# Patient Record
Sex: FEMALE | Race: WHITE | NOT HISPANIC OR LATINO | Employment: OTHER | URBAN - METROPOLITAN AREA
[De-identification: names, ages, dates, MRNs, and addresses within clinical notes are randomized per-mention and may not be internally consistent; named-entity substitution may affect disease eponyms.]

---

## 2018-08-22 ENCOUNTER — FOLLOW-UP (OUTPATIENT)
Dept: URBAN - METROPOLITAN AREA CLINIC 19 | Facility: CLINIC | Age: 63
Setting detail: DERMATOLOGY
End: 2018-08-22

## 2018-08-22 DIAGNOSIS — Z86.03 PERSONAL HISTORY OF NEOPLASM OF UNCERTAIN BEHAVIOR: ICD-10-CM

## 2018-08-22 DIAGNOSIS — L82.1 OTHER SEBORRHEIC KERATOSIS: ICD-10-CM

## 2018-08-22 DIAGNOSIS — L74.519 PRIMARY FOCAL HYPERHIDROSIS, UNSPECIFIED: ICD-10-CM

## 2018-08-22 DIAGNOSIS — L74.510 PRIMARY FOCAL HYPERHIDROSIS, AXILLA: ICD-10-CM

## 2018-08-22 DIAGNOSIS — L57.8 OTHER SKIN CHANGES DUE TO CHRONIC EXPOSURE TO NONIONIZING RADIATION: ICD-10-CM

## 2018-08-22 PROBLEM — D18.01 HEMANGIOMA OF SKIN AND SUBCUTANEOUS TISSUE: Status: RESOLVED | Noted: 2018-08-22

## 2018-08-22 PROBLEM — L82.0 INFLAMED SEBORRHEIC KERATOSIS: Status: RESOLVED | Noted: 2018-08-22

## 2018-08-22 PROBLEM — L90.5 SCAR CONDITIONS AND FIBROSIS OF SKIN: Status: RESOLVED | Noted: 2018-08-22

## 2018-08-22 PROCEDURE — 17110 DESTRUCT B9 LESION 1-14: CPT

## 2018-08-22 PROCEDURE — 99213 OFFICE O/P EST LOW 20 MIN: CPT

## 2018-11-28 ENCOUNTER — FOLLOW-UP (OUTPATIENT)
Dept: URBAN - METROPOLITAN AREA CLINIC 19 | Facility: CLINIC | Age: 63
Setting detail: DERMATOLOGY
End: 2018-11-28

## 2018-11-28 ENCOUNTER — RX ONLY (RX ONLY)
Age: 63
End: 2018-11-28

## 2018-11-28 DIAGNOSIS — D49.2 NEOPLASM OF UNSPECIFIED BEHAVIOR OF BONE, SOFT TISSUE, AND SKIN: ICD-10-CM

## 2018-11-28 PROCEDURE — 99212 OFFICE O/P EST SF 10 MIN: CPT

## 2018-11-28 RX ORDER — IVERMECTIN 10 MG/G
1 APPLICATION CREAM TOPICAL DAILY
Qty: 45 | Refills: 3
Start: 2018-11-28

## 2019-08-28 ENCOUNTER — COMPLETE SKIN EXAM (OUTPATIENT)
Dept: URBAN - METROPOLITAN AREA CLINIC 19 | Facility: CLINIC | Age: 64
Setting detail: DERMATOLOGY
End: 2019-08-28

## 2019-08-28 DIAGNOSIS — C44.729 SQUAMOUS CELL CARCINOMA OF SKIN OF LEFT LOWER LIMB, INCLUDING HIP: ICD-10-CM

## 2019-08-28 PROBLEM — L82.1 OTHER SEBORRHEIC KERATOSIS: Status: RESOLVED | Noted: 2019-08-28

## 2019-08-28 PROBLEM — D18.01 HEMANGIOMA OF SKIN AND SUBCUTANEOUS TISSUE: Status: RESOLVED | Noted: 2019-08-28

## 2019-08-28 PROCEDURE — 99214 OFFICE O/P EST MOD 30 MIN: CPT

## 2019-10-16 ENCOUNTER — RX ONLY (RX ONLY)
Age: 64
End: 2019-10-16

## 2019-10-16 ENCOUNTER — OTHER (OUTPATIENT)
Dept: URBAN - METROPOLITAN AREA CLINIC 19 | Facility: CLINIC | Age: 64
Setting detail: DERMATOLOGY
End: 2019-10-16

## 2019-10-16 DIAGNOSIS — Z48.02 ENCOUNTER FOR REMOVAL OF SUTURES: ICD-10-CM

## 2019-10-16 PROBLEM — B35.4 TINEA CORPORIS: Status: RESOLVED | Noted: 2019-10-16

## 2019-10-16 PROCEDURE — 99212 OFFICE O/P EST SF 10 MIN: CPT

## 2019-10-16 RX ORDER — CICLOPIROX 7.7 MG/G
AS DIRECTED GEL TOPICAL TWICE A DAY
Qty: 45 | Refills: 0
Start: 2019-10-16

## 2019-10-16 RX ORDER — DESONIDE 0.5 MG/G
A SMALL AMOUNT CREAM TOPICAL TWICE A DAY
Qty: 60 | Refills: 1
Start: 2019-10-16

## 2020-08-26 ENCOUNTER — COMPLETE SKIN EXAM (OUTPATIENT)
Dept: URBAN - METROPOLITAN AREA CLINIC 19 | Facility: CLINIC | Age: 65
Setting detail: DERMATOLOGY
End: 2020-08-26

## 2020-08-26 DIAGNOSIS — L82.0 INFLAMED SEBORRHEIC KERATOSIS: ICD-10-CM

## 2020-08-26 PROBLEM — Z85.828 PERSONAL HISTORY OF OTHER MALIGNANT NEOPLASM OF SKIN: Status: RESOLVED | Noted: 2020-08-26

## 2020-08-26 PROBLEM — L57.0 ACTINIC KERATOSIS: Status: RESOLVED | Noted: 2020-08-26

## 2020-08-26 PROCEDURE — 17003 DESTRUCT PREMALG LES 2-14: CPT

## 2020-08-26 PROCEDURE — 99214 OFFICE O/P EST MOD 30 MIN: CPT

## 2020-08-26 PROCEDURE — 17000 DESTRUCT PREMALG LESION: CPT

## 2021-02-24 ENCOUNTER — SPOT (OUTPATIENT)
Dept: URBAN - METROPOLITAN AREA CLINIC 19 | Facility: CLINIC | Age: 66
Setting detail: DERMATOLOGY
End: 2021-02-24

## 2021-02-24 DIAGNOSIS — L70.0 ACNE VULGARIS: ICD-10-CM

## 2021-02-24 PROCEDURE — 11103 TANGNTL BX SKIN EA SEP/ADDL: CPT

## 2021-02-24 PROCEDURE — 11102 TANGNTL BX SKIN SINGLE LES: CPT

## 2021-03-31 ENCOUNTER — CRYOTHERAPY (OUTPATIENT)
Dept: URBAN - METROPOLITAN AREA CLINIC 19 | Facility: CLINIC | Age: 66
Setting detail: DERMATOLOGY
End: 2021-03-31

## 2021-03-31 PROBLEM — L57.0 ACTINIC KERATOSIS: Status: RESOLVED | Noted: 2021-03-31

## 2021-03-31 PROCEDURE — 17000 DESTRUCT PREMALG LESION: CPT

## 2021-09-20 ENCOUNTER — OFFICE (OUTPATIENT)
Dept: URBAN - METROPOLITAN AREA CLINIC 72 | Facility: CLINIC | Age: 66
End: 2021-09-20
Payer: COMMERCIAL

## 2021-09-20 VITALS
DIASTOLIC BLOOD PRESSURE: 73 MMHG | HEART RATE: 80 BPM | SYSTOLIC BLOOD PRESSURE: 162 MMHG | WEIGHT: 121 LBS | HEIGHT: 59 IN

## 2021-09-20 DIAGNOSIS — Z90.49 ACQUIRED ABSENCE OF OTHER SPECIFIED PARTS OF DIGESTIVE TRACT: ICD-10-CM

## 2021-09-20 DIAGNOSIS — M81.0 AGE-RELATED OSTEOPOROSIS WITHOUT CURRENT PATHOLOGICAL FRACTU: ICD-10-CM

## 2021-09-20 DIAGNOSIS — K50.818 CROHN'S DISEASE OF BOTH SMALL AND LARGE INTESTINE WITH OTHER: ICD-10-CM

## 2021-09-20 PROCEDURE — 99204 OFFICE O/P NEW MOD 45 MIN: CPT | Performed by: INTERNAL MEDICINE

## 2021-09-20 RX ORDER — SODIUM SULFATE, MAGNESIUM SULFATE, AND POTASSIUM CHLORIDE 17.75; 2.7; 2.25 G/1; G/1; G/1
TABLET ORAL
Qty: 1 | Refills: 0 | Status: COMPLETED
Start: 2021-09-20 | End: 2021-11-17

## 2021-09-20 NOTE — SERVICENOTES
Our goal is to partner with you to improve your health and well being. It is important for you to complete necessary testing and follow the instructions given to you at your clinic visit. Our office will call you within 2 weeks with results of any testing but you may also call sooner to obtain results - [phone].   If you have any questions or concerns please feel free to call us.  We take your care very seriously and we thank you for your trust!
- continue current dose of pentasa for now
- labs, stool and colonoscopy
- follow up 7-10 days after procedure

## 2021-09-20 NOTE — SERVICEHPINOTES
66 year old female with crohn's disease
amada ybarra 
She quit smoking in 1977.  She has had crohn's since her 20's.  amada ybarra She was seen by Dr. Gutierrez in 1/2017 per that visit
amada "She reports that she was diagnosed with Crohn's ileocolitis at age 19 and in reviewing some of the records that she brings with her today, she has had multiple surgeries with ~68cm of resected small bowel. She reports that her last surgery was a little over 10yrs ago.Lanette most recent colonsocopy was done in 6/2015 (while living in [place]) and was remarkable for an ileo-colonic anastamosis in the ascending colon which was patent - however, the anastamosis and her caleb-TI contained a few patchy non-bleeding erosions.brPer her report, she has only been on 5-ASA medications - previously Pentasa but currently Sulfasalazine. On this regimen, she has been having 1-3 formed BMs/day without any GI tract bleeding symptoms or abdominal pain/cramping. She reports that her weight has been stable and her appetite has been good. She also reports that she has been diagnosed with osteoporosis and has been on Prolia but could not tolerate it - she is going to have a follow-up DEXA scan through 's office in April."
amada ybarra She did not follow up on Dr. Gutierrez's recommendation for work up and treatmnet.  
amada Mayers has been seeing Dr. Meneses in [facility].  Her last surgery was 1994.  She flared around 2012.  She has tried to stay off biologics.  She had another flare in June and was on prednisone since June to Aug.  When she flares she gets bad abdominal pain and obstructive symptoms.  Her last colonoscopy was at least 4-5 years ago.  
amada ybarra She is currently on pentasa 6 pills a day.  She is also on medicine for GERD (prevacid and pepcid) and has had a fundoplication
amada ybarra Currently she has a BM 2-3 BM in the AM soft to water.  No blood.  Only mild pain after eating.  My nurse has reviewed and updated the medication list with the patient (medication reconciliation). I have also reviewed the medication list. New updates were made to the patient's medical, social and family history. Pertinent details are also noted above in the HPI

## 2021-09-22 ENCOUNTER — COMPLETE SKIN EXAM (OUTPATIENT)
Dept: URBAN - METROPOLITAN AREA CLINIC 19 | Facility: CLINIC | Age: 66
Setting detail: DERMATOLOGY
End: 2021-09-22

## 2021-09-22 DIAGNOSIS — L40.9 PSORIASIS, UNSPECIFIED: ICD-10-CM

## 2021-09-22 PROBLEM — L82.1 OTHER SEBORRHEIC KERATOSIS: Status: RESOLVED | Noted: 2021-09-22

## 2021-09-22 PROBLEM — D18.01 HEMANGIOMA OF SKIN AND SUBCUTANEOUS TISSUE: Status: RESOLVED | Noted: 2021-09-22

## 2021-09-22 PROCEDURE — 99213 OFFICE O/P EST LOW 20 MIN: CPT

## 2021-11-10 ENCOUNTER — OFFICE (OUTPATIENT)
Dept: URBAN - METROPOLITAN AREA PATHOLOGY 24 | Facility: PATHOLOGY | Age: 66
End: 2021-11-10
Payer: COMMERCIAL

## 2021-11-10 ENCOUNTER — AMBULATORY SURGICAL CENTER (OUTPATIENT)
Dept: URBAN - METROPOLITAN AREA SURGERY 19 | Facility: SURGERY | Age: 66
End: 2021-11-10
Payer: COMMERCIAL

## 2021-11-10 DIAGNOSIS — K62.4 STENOSIS OF ANUS AND RECTUM: ICD-10-CM

## 2021-11-10 DIAGNOSIS — K50.90 CROHN'S DISEASE, UNSPECIFIED, WITHOUT COMPLICATIONS: ICD-10-CM

## 2021-11-10 DIAGNOSIS — K51.40 INFLAMMATORY POLYPS OF COLON WITHOUT COMPLICATIONS: ICD-10-CM

## 2021-11-10 PROCEDURE — 45380 COLONOSCOPY AND BIOPSY: CPT | Performed by: INTERNAL MEDICINE

## 2021-11-10 PROCEDURE — 88342 IMHCHEM/IMCYTCHM 1ST ANTB: CPT | Performed by: INTERNAL MEDICINE

## 2021-11-10 PROCEDURE — 88305 TISSUE EXAM BY PATHOLOGIST: CPT | Performed by: INTERNAL MEDICINE

## 2021-11-18 ENCOUNTER — OFFICE (OUTPATIENT)
Dept: URBAN - METROPOLITAN AREA CLINIC 72 | Facility: CLINIC | Age: 66
End: 2021-11-18
Payer: COMMERCIAL

## 2021-11-18 VITALS
DIASTOLIC BLOOD PRESSURE: 62 MMHG | SYSTOLIC BLOOD PRESSURE: 108 MMHG | WEIGHT: 121 LBS | HEART RATE: 68 BPM | HEIGHT: 59 IN | OXYGEN SATURATION: 99 %

## 2021-11-18 DIAGNOSIS — K50.818 CROHN'S DISEASE OF BOTH SMALL AND LARGE INTESTINE WITH OTHER: ICD-10-CM

## 2021-11-18 PROCEDURE — 99214 OFFICE O/P EST MOD 30 MIN: CPT | Performed by: INTERNAL MEDICINE

## 2021-11-18 RX ORDER — VEDOLIZUMAB 300 MG/5ML
INJECTION, POWDER, LYOPHILIZED, FOR SOLUTION INTRAVENOUS
Qty: 1 | Refills: 8 | Status: COMPLETED
Start: 2021-11-18 | End: 2022-11-28

## 2021-11-18 RX ORDER — BUDESONIDE 3 MG/1
CAPSULE ORAL
Qty: 84 | Refills: 0 | Status: COMPLETED
Start: 2021-11-18 | End: 2022-05-23

## 2021-11-18 NOTE — SERVICENOTES
Our goal is to partner with you to improve your health and well being. It is important for you to complete necessary testing and follow the instructions given to you at your clinic visit. Our office will call you within 2 weeks with results of any testing but you may also call sooner to obtain results [phone].   If you have any questions or concerns please feel free to call.  We take your care very seriously and we thank you for your trust!
- if you don't hear from us by the end of november please call
- we will start getting entyvio arranged
- labs today
- budesonide 3 pills a day for 2 weeks, then 2 pills a day for 2 weeks, then 1 pill a day for 2 weeks.  
- Once you are on the entyvio and doing well we will repeat fecal calprotectin and decide about weaning off pentasa
- follow up in 4 months, sooner if needed

## 2021-11-18 NOTE — SERVICEHPINOTES
Denisa Daily   is seen today for a follow-up visit.  
br
br66 year old female with crohn's diseaseShe quit smoking in 1977. She has had crohn's since her 20's. She was seen by Dr. Gutierrez in 1/2017 per that visitbr"She reports that she was diagnosed with Crohn's ileocolitis at age 19 and in reviewing some of the records that she brings with her today, she has had multiple surgeries with ~68cm of resected small bowel. She reports that her last surgery was a little over 10yrs ago.Lanette most recent colonsocopy was done in 6/2015 (while living in [place]) and was remarkable for an ileo-colonic anastamosis in the ascending colon which was patent - however, the anastamosis and her caleb-TI contained a few patchy non-bleeding erosions.brPer her report, she has only been on 5-ASA medications - previously Pentasa but currently Sulfasalazine. On this regimen, she has been having 1-3 formed BMs/day without any GI tract bleeding symptoms or abdominal pain/cramping. She reports that her weight has been stable and her appetite has been good. She also reports that she has been diagnosed with osteoporosis and has been on Prolia but could not tolerate it - she is going to have a follow-up DEXA scan through 's office in April."She did not follow up on Dr. Gutierrez's recommendation for work up and treatmnet. She has been seeing Dr. eMneses in [facility]. Her last surgery was 1994. She flared around 2012. She has tried to stay off biologics. She had another flare in June and was on prednisone since June to Aug. When she flares she gets bad abdominal pain and obstructive symptoms. Her last colonoscopy was at least 4-5 years ago. She is currently on pentasa 6 pills a day. She is also on medicine for GERD (prevacid and pepcid) and has had a fundoplicationCurrently she has a BM 2-3 BM in the AM soft to water. No blood. Only mild pain after eating.    br  Plan from last visit:
amada
br- continue current dose of pentasa for nowbr- labs, stool and colonoscopybr- follow up 7-10 days after procedure Interval History:  11/18/2021   
amada ybarra   She has been flaring, having some RLQ pain and diarrhea.  Sometimes she gets it into the back and into the perineum.  She is watching her diet.  
br
She hasn't seen any blood in th estool.  br She is taking pentasa 6 pills a day. when she tried to cut down dose she felt like she got worse. 
br
br br
br ?My nurse has reviewed and updated the medication list with the patient (medication reconciliation). I have also reviewed the medication list. New updates were made to the patient's medical, social and family history. Pertinent details are also noted above in the HPI.br visited="true"

## 2022-03-03 ENCOUNTER — OFFICE (OUTPATIENT)
Dept: URBAN - METROPOLITAN AREA CLINIC 72 | Facility: CLINIC | Age: 67
End: 2022-03-03
Payer: COMMERCIAL

## 2022-03-03 VITALS
SYSTOLIC BLOOD PRESSURE: 132 MMHG | WEIGHT: 124 LBS | RESPIRATION RATE: 14 BRPM | HEIGHT: 59 IN | DIASTOLIC BLOOD PRESSURE: 76 MMHG | TEMPERATURE: 97.9 F | HEART RATE: 64 BPM

## 2022-03-03 DIAGNOSIS — Z90.49 ACQUIRED ABSENCE OF OTHER SPECIFIED PARTS OF DIGESTIVE TRACT: ICD-10-CM

## 2022-03-03 DIAGNOSIS — K50.818 CROHN'S DISEASE OF BOTH SMALL AND LARGE INTESTINE WITH OTHER: ICD-10-CM

## 2022-03-03 PROCEDURE — 99214 OFFICE O/P EST MOD 30 MIN: CPT | Performed by: INTERNAL MEDICINE

## 2022-03-03 NOTE — SERVICENOTES
Our goal is to partner with you to improve your health and well being. It is important for you to complete necessary testing and follow the instructions given to you at your clinic visit. Our office will call you within 2 weeks with results of any testing but you may also call sooner to obtain results [phone].   If you have any questions or concerns please feel free to call.  We take your care very seriously and we thank you for your trust!
- continue entyvio
- labs and stool studies
- we will adjust supplements based on your labs
- get stool test, depending on your stool inflammation we will do xifaxin, wean pentasa or do both.  
- follow up in 3 months

## 2022-03-03 NOTE — SERVICEHPINOTES
Denisa Daily   is seen today for a follow-up visit.  
br
br66 year old female with crohn's disease, she also has osteoporosisShe quit smoking in 1977. She has had crohn's since her 20's.She was seen by Dr. Gutierrez in 1/2017 per that visitbr"She reports that she was diagnosed with Crohn's ileocolitis at age 19 and in reviewing some of the records that she brings with her today, she has had multiple surgeries with ~68cm of resected small bowel. She reports that her last surgery was a little over 10yrs ago.Lanette most recent colonsocopy was done in 6/2015 (while living in [place]) and was remarkable for an ileo-colonic anastamosis in the ascending colon which was patent - however, the anastamosis and her caleb-TI contained a few patchy non-bleeding erosions.brPer her report, she has only been on 5-ASA medications - previously Pentasa but currently Sulfasalazine. On this regimen, she has been having 1-3 formed BMs/day without any GI tract bleeding symptoms or abdominal pain/cramping. She reports that her weight has been stable and her appetite has been good. She also reports that she has been diagnosed with osteoporosis and has been on Prolia but could not tolerate it - she is going to have a follow-up DEXA scan through 's office in April."She did not follow up on Dr. Gutierrez's recommendation for work up and treatmnet.She has been seeing Dr. Meneses in [facility]. Her last surgery was 1994. She flared around 2012. She has tried to stay off biologics. She had another flare in June and was on prednisone since June to Aug. When she flares she gets bad abdominal pain and obstructive symptoms. Her last colonoscopy was at least 4-5 years ago.She is currently on pentasa 6 pills a day. She is also on medicine for GERD (prevacid and pepcid) and has had a fundoplicationCurrently she has a BM 2-3 BM in the AM soft to water. No blood. Only mild pain after eating.brPlan from last visit:- continue current dose of pentasa for nowbr- labs, stool and colonoscopybr- follow up 7-10 days after procedureInterval History:11/18/2021She has been flaring, having some RLQ pain and diarrhea. Sometimes she gets it into the back and into the perineum. She is watching her diet. Riana hasn't seen any blood in th estool. Riana is taking pentasa 6 pills a day. when she tried to cut down dose she felt like she got worse.    br  Plan from last visit:
br
br- if you don't hear from us by the end of november please callbr- we will start getting entyvio arrangedbr- labs todaybr- budesonide 3 pills a day for 2 weeks, then 2 pills a day for 2 weeks, then 1 pill a day for 2 weeks. br- Once you are on the entyvio and doing well we will repeat fecal calprotectin and decide about weaning off pentasabr- follow up in 4 months, sooner if needed Interval History:  3/3/2022   
br   She received initial dose of entyvio 12/23
br She has had 3 doses.  she is now on the 8 week maintenance. 
br She is starting to feel a little better. 
Riana is getting it done at [address] in  which is working well for us she doesn't pay anything. 
br 
She is off budesonide.  
br She is still taking pentasa 6 pills ad ay. 
br She is still having some right pelvic pain that she feels like is improving. 
br She is still having loose BM, diarrhea is not as often.  It was intiailly 4-5 times a day and now twice a day br
br My nurse has reviewed and updated the medication list with the patient (medication reconciliation). I have also reviewed the medication list. New updates were made to the patient's medical, social and family history. Pertinent details are also noted above in the HPI.br visited="true"

## 2022-05-23 ENCOUNTER — OFFICE (OUTPATIENT)
Dept: URBAN - METROPOLITAN AREA CLINIC 72 | Facility: CLINIC | Age: 67
End: 2022-05-23
Payer: COMMERCIAL

## 2022-05-23 VITALS
SYSTOLIC BLOOD PRESSURE: 122 MMHG | HEIGHT: 59 IN | OXYGEN SATURATION: 99 % | DIASTOLIC BLOOD PRESSURE: 82 MMHG | HEART RATE: 70 BPM | WEIGHT: 121 LBS

## 2022-05-23 DIAGNOSIS — K50.818 CROHN'S DISEASE OF BOTH SMALL AND LARGE INTESTINE WITH OTHER: ICD-10-CM

## 2022-05-23 DIAGNOSIS — Z90.49 ACQUIRED ABSENCE OF OTHER SPECIFIED PARTS OF DIGESTIVE TRACT: ICD-10-CM

## 2022-05-23 DIAGNOSIS — D51.3 OTHER DIETARY VITAMIN B12 DEFICIENCY ANEMIA: ICD-10-CM

## 2022-05-23 DIAGNOSIS — E55.9 VITAMIN D DEFICIENCY, UNSPECIFIED: ICD-10-CM

## 2022-05-23 PROCEDURE — 99214 OFFICE O/P EST MOD 30 MIN: CPT | Performed by: INTERNAL MEDICINE

## 2022-05-23 RX ORDER — SODIUM SULFATE, MAGNESIUM SULFATE, AND POTASSIUM CHLORIDE 17.75; 2.7; 2.25 G/1; G/1; G/1
TABLET ORAL
Qty: 1 | Refills: 0 | Status: ACTIVE
Start: 2022-05-23

## 2022-05-23 NOTE — SERVICEHPINOTES
Denisa Daily   is seen today for a follow-up visit.  
br
br66 year old female with crohn's disease, she also has [id number] 
br She quit smoking in 1977. She has had crohn's since her 20's.She was seen by Dr. Gutierrez in 1/2017 per that visitbr"She reports that she was diagnosed with Crohn's ileocolitis at age 19 and in reviewing some of the records that she brings with her today, she has had multiple surgeries with ~68cm of resected small bowel. She reports that her last surgery was a little over 10yrs ago.Lanette most recent colonoscope was done in 6/2015 (while living in [place]) and was remarkable for an ileo-colonic anastomosis in the ascending colon which was patent - however, the anastomosis and her caleb-TI contained a few patchy non-bleeding erosions.brPer her report, she has only been on 5-ASA medications - previously Pentasa but currently Sulfasalazine. On this regimen, she has been having 1-3 formed BMs/day without any GI tract bleeding symptoms or abdominal pain/cramping. She reports that her weight has been stable and her appetite has been good. She also reports that she has been diagnosed with osteoporosis and has been on Prolia but could not tolerate it - she is going to have a follow-up DEXA scan through 's office in April."She did not follow up on Dr. Gutierrez's recommendation for work up and treatment.She has been seeing Dr. Meneses in [facility]. Her last surgery was 1994. She flared around 2012. She has tried to stay off biologics. She had another flare in June and was on prednisone since June to Aug. When she flares she gets bad abdominal pain and obstructive symptoms. Her last colonoscopy was at least 4-5 years ago.She is currently on pentasa 6 pills a day. She is also on medicine for GERD (prevacid and pepcid) and has had a fundoplicationCurrently she has a BM 2-3 BM in the AM soft to water. No blood. Only mild pain after eating.Interval History:11/18/2021She has been flaring, having some RLQ pain and diarrhea. Sometimes she gets it into the back and into the perineum. She is watching her diet.brShe hasn't seen any blood in th estool.brSdarion is taking pentasa 6 pills a day. when she tried to cut down dose she felt like she got worse.brInterval History:3/3/2022bAndrae received initial dose of entyvio 12/23brShe has had 3 doses. she is now on the 8 week maintenance. Riana is starting to feel a little better. Riana is getting it done at [address] in  which is working well for us she doesn't pay anything. Riana is off budesonide. Riana is still taking pentasa 6 pills ad ay. Riana is still having some right pelvic pain that she feels like is improving. Riana is still having loose BM, diarrhea is not as often. It was intiailly 4-5 times a day and now twice a day br    br  Plan from last visit:
br
br continue entyviobr- labs and stool studiesbr- we will adjust supplements based on your labsbr- get stool test, depending on your stool inflammation we will do xifaxin, wean pentasa or do both. br- follow up in 3 months Interval History:  5/23/2022   
br   vit D and b12 were normal 
br FC was 130
br I asked her to remain on pentasa and start xifaxin
brHer last infusion was about 8 weeks ago.  She felt like that helped her back and perineal pain is better. 
br SHe has had 4 URI infections on entyvio but she has also had lots of exposures [facility] and kids are in . 
br She was seen in urgent care and all covid, strep, flu rudi were negative. 
br She is also having some diarrhea and pain.  
br She did not notice any difference with the xifaxin.  she is taking pentasa 6 pills a day
brIn the last couple days she has had up to 10 BM a day.  No blood in the stool.  br My nurse has reviewed and updated the medication list with the patient (medication reconciliation). I have also reviewed the medication list. New updates were made to the patient's medical, social and family history. Pertinent details are also noted above in the HPI.br visited="true"

## 2022-05-23 NOTE — SERVICENOTES
Our goal is to partner with you to improve your health and well being. It is important for you to complete necessary testing and follow the instructions given to you at your clinic visit. Our office will call you within 2 weeks with results of any testing but you may also call sooner to obtain results [phone].   If you have any questions or concerns please feel free to call.  We take your care very seriously and we thank you for your trust!
- schedule colonoscopy, if you have any issues with the prep (ie high cost, not covered) at the pharmacy please let us know
- continue entyvio for now
- schedule ct enterography
- Follow up 7-14 days after the procedure. 
- get stool test Statement Selected

## 2022-05-25 ENCOUNTER — OFFICE (OUTPATIENT)
Dept: URBAN - METROPOLITAN AREA CLINIC 67 | Facility: CLINIC | Age: 67
End: 2022-05-25
Payer: COMMERCIAL

## 2022-05-25 VITALS
HEART RATE: 69 BPM | SYSTOLIC BLOOD PRESSURE: 121 MMHG | RESPIRATION RATE: 16 BRPM | HEART RATE: 72 BPM | DIASTOLIC BLOOD PRESSURE: 77 MMHG | OXYGEN SATURATION: 98 % | HEIGHT: 59 IN | TEMPERATURE: 98.1 F | SYSTOLIC BLOOD PRESSURE: 138 MMHG | DIASTOLIC BLOOD PRESSURE: 68 MMHG | WEIGHT: 121 LBS

## 2022-05-25 DIAGNOSIS — K50.818 CROHN'S DISEASE OF BOTH SMALL AND LARGE INTESTINE WITH OTHER: ICD-10-CM

## 2022-05-25 DIAGNOSIS — K50.90 CROHN'S DISEASE, UNSPECIFIED, WITHOUT COMPLICATIONS: ICD-10-CM

## 2022-05-25 DIAGNOSIS — E55.9 VITAMIN D DEFICIENCY, UNSPECIFIED: ICD-10-CM

## 2022-05-25 DIAGNOSIS — D51.9 VITAMIN B12 DEFICIENCY ANEMIA, UNSPECIFIED: ICD-10-CM

## 2022-05-25 PROCEDURE — 96413 CHEMO IV INFUSION 1 HR: CPT | Performed by: INTERNAL MEDICINE

## 2022-05-25 PROCEDURE — 99213 OFFICE O/P EST LOW 20 MIN: CPT | Performed by: INTERNAL MEDICINE

## 2022-05-25 RX ADMIN — VEDOLIZUMAB 300 MG: 300 INJECTION, POWDER, LYOPHILIZED, FOR SOLUTION INTRAVENOUS at 09:38

## 2022-05-25 NOTE — SERVICENOTES
Patient observed for 15 minutes post infusion.  Patient denies chest pain, shortness of breath, or dizziness.  Handout given and reviewed with patient.  Instructed on common side effects.  Patient has no questions.

## 2022-05-25 NOTE — SERVICEHPINOTES
See follow-up visit from:   5/23/2022   
br   Date &amp Results of last TB test:   11/18/2021     Negative   
br   Labs and/or Additional Orders: 
br Insurance authorization:no [place] requiredbr Pharmacy:   [facility]   
br   Rate of Infusion:  Standard  brInfusion order placed on:  5/25/2022   
br 
Time out performed with: Iesha Bang

## 2022-07-22 ENCOUNTER — AMBULATORY SURGICAL CENTER (OUTPATIENT)
Dept: URBAN - METROPOLITAN AREA SURGERY 19 | Facility: SURGERY | Age: 67
End: 2022-07-22
Payer: COMMERCIAL

## 2022-07-22 DIAGNOSIS — K57.30 DIVERTICULOSIS OF LARGE INTESTINE WITHOUT PERFORATION OR ABS: ICD-10-CM

## 2022-07-22 DIAGNOSIS — K50.80 CROHN'S DISEASE OF BOTH SMALL AND LARGE INTESTINE WITHOUT CO: ICD-10-CM

## 2022-07-22 LAB
COLONOSCOPY STUDY: (no result)
COLONOSCOPY STUDY: (no result)

## 2022-07-22 PROCEDURE — 45378 DIAGNOSTIC COLONOSCOPY: CPT | Performed by: SPECIALIST

## 2022-07-28 ENCOUNTER — OFFICE (OUTPATIENT)
Dept: URBAN - METROPOLITAN AREA CLINIC 72 | Facility: CLINIC | Age: 67
End: 2022-07-28
Payer: COMMERCIAL

## 2022-07-28 VITALS
RESPIRATION RATE: 12 BRPM | WEIGHT: 121 LBS | HEIGHT: 59 IN | DIASTOLIC BLOOD PRESSURE: 62 MMHG | HEART RATE: 72 BPM | SYSTOLIC BLOOD PRESSURE: 128 MMHG

## 2022-07-28 DIAGNOSIS — K50.019 CROHN'S DISEASE OF SMALL INTESTINE WITH UNSPECIFIED COMPLICA: ICD-10-CM

## 2022-07-28 DIAGNOSIS — Z90.49 ACQUIRED ABSENCE OF OTHER SPECIFIED PARTS OF DIGESTIVE TRACT: ICD-10-CM

## 2022-07-28 DIAGNOSIS — E55.9 VITAMIN D DEFICIENCY, UNSPECIFIED: ICD-10-CM

## 2022-07-28 DIAGNOSIS — E53.8 DEFICIENCY OF OTHER SPECIFIED B GROUP VITAMINS: ICD-10-CM

## 2022-07-28 PROCEDURE — 99214 OFFICE O/P EST MOD 30 MIN: CPT | Performed by: INTERNAL MEDICINE

## 2022-07-28 RX ORDER — BUDESONIDE 3 MG/1
CAPSULE ORAL
Qty: 132 | Refills: 0 | Status: COMPLETED
Start: 2022-07-28 | End: 2022-11-28

## 2022-07-28 NOTE — SERVICENOTES
Our goal is to partner with you to improve your health and well being. It is important for you to complete necessary testing and follow the instructions given to you at your clinic visit. Our office will call you within 2 weeks with results of any testing but you may also call sooner to obtain results [phone].   If you have any questions or concerns please feel free to call.  We take your care very seriously and we thank you for your trust!
- start stellara, Radha should reach out to you
- start entocort 3 pills a day.  Once you have the stellara infusion then decrease to 2 pills a day for 2 weeks and then 1 pill a day for 2 weeks.
- Please decrease your pentasa down to 4 pills a day. Once you are feeling better I would like to wean to two then off. 
- , STELARA® may increase the risk of infections and reactivation of latent infections. Serious bacterial, mycobacterial, fungal, and viral infections requiring hospitalization or otherwise clinically significant infections were reported. In patients with Crohn’s disease, these included anal abscess, gastroenteritis, ophthalmic herpes zoster, pneumonia, and Listeria meningitis. In patients with ulcerative colitis, these included gastroenteritis, ophthalmic herpes zoster, pneumonia, and listeriosis.
Malignancies
STELARA® is an immunosuppressant and may increase the risk of malignancy. Malignancies were reported among patients who received STELARA® in clinical studies. The safety of STELARA® has not been evaluated in patients who have a history of malignancy or who have a known malignancy. There have been reports of the rapid appearance of multiple cutaneous squamous cell carcinomas in patients receiving STELARA® who had risk factors for developing non-melanoma skin cancer (NMSC). All patients receiving STELARA®, especially those >60 years or those with a history of PUVA or prolonged immunosuppressant treatment, should be monitored for the appearance of NMSC.
Hypersensitivity Reactions
Hypersensitivity reactions, including anaphylaxis and angioedema, have been reported with STELARA®. 
Posterior Reversible Encephalopathy Syndrome (PRES)
Two cases of posterior reversible encephalopathy syndrome (PRES), also known as Reversible Posterior Leukoencephalopathy Syndrome (RPLS), were reported in clinical trials. Cases have also been reported in postmarketing experience in patients with Crohn’s disease. Clinical presentation included headaches, seizures, confusion, visual disturbances, and imaging changes consistent with PRES a few days to several months after ustekinumab initiation. A few cases reported latency of a year or longer. Patients recovered with supportive care following withdrawal of ustekinumab.
Immunizations
Prior to initiating therapy with STELARA®, patients should receive all age-appropriate immunizations recommended by current guidelines. Patients being treated with STELARA® should not receive live vaccines. 
Noninfectious Pneumonia
Cases of interstitial pneumonia, eosinophilic pneumonia, and cryptogenic organizing pneumonia have been reported during post-approval use of STELARA®. Clinical presentations included cough, dyspnea, and interstitial infiltrates following one to three doses. 
Allergen Immunotherapy
STELARA® may decrease the protective effect of allergen immunotherapy (decrease tolerance) which may increase the risk of an allergic reaction to a dose of allergen immunotherapy. Therefore, caution should be exercised in patients receiving or who have received allergen immunotherapy, particularly for anaphylaxis.
Most Common Adverse Reactions
The most common adverse reactions (=3% and higher than that with placebo) in adults from psoriasis clinical studies for STELARA® 45 mg, STELARA® 90 mg, or placebo were: nasopharyngitis (8%, 7%, 8%), upper respiratory tract infection (5%, 4%, 5%), headache (5%, 5%, 3%), and fatigue (3%, 3%, 2%), respectively. In Crohn’s disease induction studies, common adverse reactions (3% or more of patients treated with STELARA® and higher than placebo) reported through Week 8 for STELARA® 6 mg/kg intravenous single infusion or placebo included: vomiting (4% vs 3%). In the Crohn’s disease maintenance study, common adverse reactions (3% or more of patients treated with STELARA® and higher than placebo) reported through Week 44 for STELARA® 90 mg subcutaneous injection or placebo were: nasopharyngitis (11% vs 8%), injection site erythema (5% vs 0%), vulvovaginal candidiasis/mycotic infection (5% vs 1%), bronchitis (5% vs 3%), pruritus (4% vs 2%), urinary tract infection (4% vs 2%) and sinusitis (3% vs 2%). In the ulcerative colitis induction study, common adverse reactions (3% or more of patients treated with STELARA® and higher than placebo) reported through Week 8 for STELARA® 6 mg/kg intravenous single infusion or placebo included: nasopharyngitis (7% vs 4%). In the ulcerative colitis maintenance study, common adverse reactions (3% or more of patients treated with STELARA® and higher than placebo) reported through Week 44 for STELARA® 90 mg subcutaneous injection or placebo included: nasopharyngitis (24% vs 20%), headache (10% vs 4%), abdominal pain (7% vs 3%), influenza (6% vs 5%), fever (5% vs 4%), diarrhea (4% vs 1%), sinusitis (4% vs 1%), fatigue (4% vs 2%), and nausea (3% vs 2%).
- please see your dermatologist within 6 months of stelara start
- please follow up with me in 4 months

## 2022-07-28 NOTE — SERVICEHPINOTES
Denisa Daily   is seen today for a follow-up visit.  
br
br66 year old female with small crohn's disease since her 20's, remote smoking (none since 1977) status post ileocacal resection and SB surgery with about 68 cm of SB removed. she also has [id number] was seen by Dr. Gutierrez in 1/2017 per that visitbr"She reports that she was diagnosed with Crohn's ileocolitis at age 19 and in reviewing some of the records that she brings with her today, she has had multiple surgeries with ~68cm of resected small bowel. She reports that her last surgery was a little over 10yrs ago.Lanette most recent colonoscope was done in 6/2015 (while living in [place]) and was remarkable for an ileo-colonic anastomosis in the ascending colon which was patent - however, the anastomosis and her caleb-TI contained a few patchy non-bleeding erosions.brPer her report, she has only been on 5-ASA medications - previously Pentasa but currently Sulfasalazine. On this regimen, she has been having 1-3 formed BMs/day without any GI tract bleeding symptoms or abdominal pain/cramping. She reports that her weight has been stable and her appetite has been good. She also reports that she has been diagnosed with osteoporosis and has been on Prolia but could not tolerate it - she is going to have a follow-up DEXA scan through 's office in April."She did not follow up on Dr. Gutierrez's recommendation for work up and treatment.She has been seeing Dr. Meneses in [facility]. Her last surgery was 1994. She flared around 2012. She has tried to stay off biologics. She had another flare in June and was on prednisone since June to Aug. When she flares she gets bad abdominal pain and obstructive symptoms. Her last colonoscopy was at least 4-5 years ago.She is currently on pentasa 6 pills a day. She is also on medicine for GERD (prevacid and pepcid) and has had a fundoplicationCurrently she has a BM 2-3 BM in the AM soft to water. No blood. Only mild pain after eating.Interval History:11/18/2021She has been flaring, having some RLQ pain and diarrhea. Sometimes she gets it into the back and into the perineum. She is watching her diet.brShe hasn't seen any blood in th estool.Riana is taking pentasa 6 pills a day. when she tried to cut down dose she felt like she got worse.brInterval History:3/3/2022bAndrae received initial dose of entyvio 12/23brShe has had 3 doses. she is now on the 8 week maintenance.Riana is starting to feel a little better.Riana is getting it done at 12 [address] in  which is working well for us she doesn't pay anything.Riana is off budesonide.Riana is still taking pentasa 6 pills ad ay.Riana is still having some right pelvic pain that she feels like is improving.Riana is still having loose BM, diarrhea is not as often. It was intiailly 4-5 times a day and now twice a daybrInterval History:5/23/2022brvit D and b12 were normalbrFC was 130brI asked her to remain on pentasa and start xifaxinbrHer last infusion was about 8 weeks ago. She felt like that helped her back and perineal pain is better. Riana has had 4 URI infections on entyvio but she has also had lots of exposures bc [facility] and kids are in . Riana was seen in urgent care and all covid, strep, flu rudi were negative. Riana is also having some diarrhea and pain. Riana did not notice any difference with the xifaxin. she is taking pentasa 6 pills a daybrIn the last couple days she has had up to 10 BM a day. No blood in the stool. br    br  Plan from last visit:
br
br- schedule colonoscopy, if you have any issues with the prep (ie high cost, not covered) at the pharmacy please let us knowbr- continue entyvio for nowbr- schedule ct enterographybr- Follow up 7-14 days after the procedure. br- get stool test Interval History:  7/28/2022   
br   5/22 C diff neg
br 
5/22 CTE with actuvie disease in the neoTIbr 7/22 colonoscopy with ileocolonic anastamosis and multiple erosions/ulcers on the ileal side and at anastmoisis.  THe colonic mucosa was normal 
br Last entyvio infusion was 5/22
brHer pain has been a little better.  She is still having diarrhea.  Her rectum gets irritated and sore.  
br She has had a squamous cell cancer on her nose and thigh.  She goes once a year for checks and have things removed from time to time.  
br She is taking the pentasa 6 pills a day. My nurse has reviewed and updated the medication list with the patient (medication reconciliation). I have also reviewed the medication list. New updates were made to the patient's medical, social and family history. Pertinent details are also noted above in the HPI.br visited="true"

## 2022-09-08 ENCOUNTER — OFFICE (OUTPATIENT)
Dept: URBAN - METROPOLITAN AREA CLINIC 67 | Facility: CLINIC | Age: 67
End: 2022-09-08
Payer: COMMERCIAL

## 2022-09-08 VITALS
RESPIRATION RATE: 14 BRPM | HEART RATE: 81 BPM | DIASTOLIC BLOOD PRESSURE: 79 MMHG | HEART RATE: 68 BPM | TEMPERATURE: 97.8 F | DIASTOLIC BLOOD PRESSURE: 81 MMHG | SYSTOLIC BLOOD PRESSURE: 132 MMHG | DIASTOLIC BLOOD PRESSURE: 74 MMHG | WEIGHT: 120 LBS | SYSTOLIC BLOOD PRESSURE: 121 MMHG | SYSTOLIC BLOOD PRESSURE: 135 MMHG | OXYGEN SATURATION: 98 % | HEART RATE: 69 BPM | TEMPERATURE: 97.6 F | OXYGEN SATURATION: 97 % | HEIGHT: 59 IN

## 2022-09-08 DIAGNOSIS — K50.818 CROHN'S DISEASE OF BOTH SMALL AND LARGE INTESTINE WITH OTHER: ICD-10-CM

## 2022-09-08 PROCEDURE — 96365 THER/PROPH/DIAG IV INF INIT: CPT

## 2022-09-08 RX ADMIN — USTEKINUMAB 260 MG: 130 SOLUTION INTRAVENOUS at 14:00

## 2022-09-08 NOTE — SERVICEHPINOTES
See follow-up visit from:   7/28/2022   
br   Date &amp Results of last TB test:   7/28/2022     Negative   
br   Labs and/or Additional Orders: 
br Insurance authorization: no PA requiredbr Pharmacy:   [facility]   
br   Rate of Infusion:  Standard  brInfusion order placed on:  7/28/2022   
br 
Time out performed with:  Iesha Bang

## 2022-09-28 ENCOUNTER — APPOINTMENT (OUTPATIENT)
Dept: URBAN - METROPOLITAN AREA SURGERY 12 | Age: 67
Setting detail: DERMATOLOGY
End: 2022-09-30

## 2022-09-28 DIAGNOSIS — D18.0 HEMANGIOMA: ICD-10-CM

## 2022-09-28 DIAGNOSIS — D22 MELANOCYTIC NEVI: ICD-10-CM

## 2022-09-28 DIAGNOSIS — L57.8 OTHER SKIN CHANGES DUE TO CHRONIC EXPOSURE TO NONIONIZING RADIATION: ICD-10-CM

## 2022-09-28 DIAGNOSIS — L82.1 OTHER SEBORRHEIC KERATOSIS: ICD-10-CM

## 2022-09-28 DIAGNOSIS — Z85.828 PERSONAL HISTORY OF OTHER MALIGNANT NEOPLASM OF SKIN: ICD-10-CM

## 2022-09-28 DIAGNOSIS — L81.4 OTHER MELANIN HYPERPIGMENTATION: ICD-10-CM

## 2022-09-28 DIAGNOSIS — D485 NEOPLASM OF UNCERTAIN BEHAVIOR OF SKIN: ICD-10-CM

## 2022-09-28 PROBLEM — D18.01 HEMANGIOMA OF SKIN AND SUBCUTANEOUS TISSUE: Status: ACTIVE | Noted: 2022-09-28

## 2022-09-28 PROBLEM — D48.5 NEOPLASM OF UNCERTAIN BEHAVIOR OF SKIN: Status: ACTIVE | Noted: 2022-09-28

## 2022-09-28 PROBLEM — D22.5 MELANOCYTIC NEVI OF TRUNK: Status: ACTIVE | Noted: 2022-09-28

## 2022-09-28 PROCEDURE — OTHER SUNSCREEN RECOMMENDATIONS: OTHER

## 2022-09-28 PROCEDURE — OTHER COUNSELING: OTHER

## 2022-09-28 PROCEDURE — 11102 TANGNTL BX SKIN SINGLE LES: CPT

## 2022-09-28 PROCEDURE — OTHER REASSURANCE: OTHER

## 2022-09-28 PROCEDURE — 99213 OFFICE O/P EST LOW 20 MIN: CPT | Mod: 25

## 2022-09-28 PROCEDURE — OTHER BIOPSY BY SHAVE METHOD: OTHER

## 2022-09-28 ASSESSMENT — LOCATION DETAILED DESCRIPTION DERM
LOCATION DETAILED: LEFT DISTAL POSTERIOR UPPER ARM
LOCATION DETAILED: RIGHT SUPERIOR UPPER BACK
LOCATION DETAILED: LEFT LATERAL FOREHEAD
LOCATION DETAILED: LEFT SUPERIOR UPPER BACK
LOCATION DETAILED: LEFT MID-UPPER BACK
LOCATION DETAILED: LEFT POSTERIOR SHOULDER

## 2022-09-28 ASSESSMENT — LOCATION SIMPLE DESCRIPTION DERM
LOCATION SIMPLE: LEFT FOREHEAD
LOCATION SIMPLE: RIGHT UPPER BACK
LOCATION SIMPLE: LEFT UPPER BACK
LOCATION SIMPLE: LEFT POSTERIOR UPPER ARM
LOCATION SIMPLE: LEFT SHOULDER

## 2022-09-28 ASSESSMENT — LOCATION ZONE DERM
LOCATION ZONE: ARM
LOCATION ZONE: FACE
LOCATION ZONE: TRUNK

## 2022-10-12 ENCOUNTER — APPOINTMENT (OUTPATIENT)
Dept: URBAN - METROPOLITAN AREA SURGERY 11 | Age: 67
Setting detail: DERMATOLOGY
End: 2022-10-12

## 2022-10-12 PROBLEM — C44.92 SQUAMOUS CELL CARCINOMA OF SKIN, UNSPECIFIED: Status: ACTIVE | Noted: 2022-10-12

## 2022-10-12 PROCEDURE — OTHER MOHS SURGERY PHONE CONSULTATION: OTHER

## 2022-10-17 ENCOUNTER — APPOINTMENT (OUTPATIENT)
Dept: URBAN - METROPOLITAN AREA SURGERY 11 | Age: 67
Setting detail: DERMATOLOGY
End: 2022-10-17

## 2022-10-17 PROBLEM — C44.329 SQUAMOUS CELL CARCINOMA OF SKIN OF OTHER PARTS OF FACE: Status: ACTIVE | Noted: 2022-10-17

## 2022-10-17 PROCEDURE — 17311 MOHS 1 STAGE H/N/HF/G: CPT

## 2022-10-17 PROCEDURE — 13131 CMPLX RPR F/C/C/M/N/AX/G/H/F: CPT

## 2022-10-17 PROCEDURE — OTHER MOHS BY LAYER: OTHER

## 2022-10-17 PROCEDURE — OTHER MIPS QUALITY: OTHER

## 2022-10-17 NOTE — PROCEDURE: MOHS BY LAYER
Body Location Override (Optional - Billing Will Still Be Based On Selected Body Map Location If Applicable): left lateral forehead
Detail Level: Detailed
Size Of Lesion: 1.1
Anesthesia Type: 2% lidocaine with epinephrine and a 1:10 solution of 8.4% sodium bicarbonate
Hemostasis: Electrocautery
Estimated Blood Loss (Cc): minimal
Bill For Surgical Tray: no
Number Of Stages: 1
Stage 1: Depth Of Layer: dermis
Stage 1: Defect X-Dimension: 1.3
Stage 2: Number Of Sections (Blocks) ?: 0
Repair Type: Complex Repair
Simple / Intermediate / Complex Repair - Final Wound Length In Cm: 2.3
Complex Requirements: Extensive Undermining Performed?: Yes
Undermining Type: Entire Wound
Debridement Text: The wound edges were debrided prior to proceeding with the closure to facilitate wound healing.
Helical Rim Text: The closure involved the helical rim.
Vermilion Border Text: The closure involved the vermilion border.
Nostril Rim Text: The closure involved the nostril rim.
Retention Suture Text: Retention sutures were placed to support the closure and prevent dehiscence.
Deep Sutures: 5-0 Monocryl
Epidermal Closure: running
Suture Removal: 7 days
Wound Care: No ointment
Consent: The rationale for Mohs was explained to the patient and consent was obtained. The risks, benefits and alternatives to therapy were discussed in detail. Specifically, the risks of infection, scarring, bleeding, prolonged wound healing, incomplete removal, allergy to anesthesia, nerve injury and recurrence were addressed. Prior to the procedure, the treatment site was clearly identified and confirmed by the patient. All components of Universal Protocol/PAUSE Rule completed.
Post-Care Instructions: I reviewed with the patient in detail post-care instructions. Patient is not to engage in any heavy lifting, exercise, or swimming for the next 14 days. Should the patient develop any fevers, chills, bleeding, severe pain patient will contact the office immediately.

## 2022-10-27 ENCOUNTER — APPOINTMENT (OUTPATIENT)
Dept: URBAN - METROPOLITAN AREA SURGERY 11 | Age: 67
Setting detail: DERMATOLOGY
End: 2022-10-27

## 2022-10-27 DIAGNOSIS — Z48.02 ENCOUNTER FOR REMOVAL OF SUTURES: ICD-10-CM

## 2022-10-27 PROCEDURE — OTHER SUTURE REMOVAL (GLOBAL PERIOD): OTHER

## 2022-10-27 PROCEDURE — 99024 POSTOP FOLLOW-UP VISIT: CPT

## 2022-10-27 ASSESSMENT — LOCATION SIMPLE DESCRIPTION DERM: LOCATION SIMPLE: SUPERIOR FOREHEAD

## 2022-10-27 ASSESSMENT — LOCATION ZONE DERM: LOCATION ZONE: FACE

## 2022-10-27 ASSESSMENT — LOCATION DETAILED DESCRIPTION DERM: LOCATION DETAILED: SUPERIOR MID FOREHEAD

## 2022-10-27 NOTE — PROCEDURE: SUTURE REMOVAL (GLOBAL PERIOD)
Body Location Override (Optional - Billing Will Still Be Based On Selected Body Map Location If Applicable): left lateral forehead
Detail Level: Simple
Add 88496 Cpt? (Important Note: In 2017 The Use Of 37427 Is Being Tracked By Cms To Determine Future Global Period Reimbursement For Global Periods): yes

## 2022-11-28 ENCOUNTER — OFFICE (OUTPATIENT)
Dept: URBAN - METROPOLITAN AREA CLINIC 72 | Facility: CLINIC | Age: 67
End: 2022-11-28
Payer: COMMERCIAL

## 2022-11-28 VITALS
DIASTOLIC BLOOD PRESSURE: 78 MMHG | WEIGHT: 124 LBS | HEART RATE: 93 BPM | OXYGEN SATURATION: 97 % | HEIGHT: 59 IN | SYSTOLIC BLOOD PRESSURE: 142 MMHG

## 2022-11-28 DIAGNOSIS — Z85.828 PERSONAL HISTORY OF OTHER MALIGNANT NEOPLASM OF SKIN: ICD-10-CM

## 2022-11-28 DIAGNOSIS — K21.9 GASTRO-ESOPHAGEAL REFLUX DISEASE WITHOUT ESOPHAGITIS: ICD-10-CM

## 2022-11-28 DIAGNOSIS — Z92.29 PERSONAL HISTORY OF OTHER DRUG THERAPY: ICD-10-CM

## 2022-11-28 DIAGNOSIS — K50.818 CROHN'S DISEASE OF BOTH SMALL AND LARGE INTESTINE WITH OTHER: ICD-10-CM

## 2022-11-28 DIAGNOSIS — E55.9 VITAMIN D DEFICIENCY, UNSPECIFIED: ICD-10-CM

## 2022-11-28 DIAGNOSIS — M81.0 AGE-RELATED OSTEOPOROSIS WITHOUT CURRENT PATHOLOGICAL FRACTU: ICD-10-CM

## 2022-11-28 DIAGNOSIS — E53.8 DEFICIENCY OF OTHER SPECIFIED B GROUP VITAMINS: ICD-10-CM

## 2022-11-28 PROCEDURE — 99215 OFFICE O/P EST HI 40 MIN: CPT | Performed by: INTERNAL MEDICINE

## 2022-11-28 RX ORDER — HYDROCORTISONE ACETATE 25 MG/1
SUPPOSITORY RECTAL
Qty: 14 | Refills: 1 | Status: ACTIVE
Start: 2022-11-28

## 2022-11-28 NOTE — SERVICEHPINOTES
Denisa Daily   is seen today for a follow-up visit.  
br
br66 year old female with small crohn's disease since her 20's, remote smoking (none since 1977) status post ileocacal resection and SB surgery with about 68 cm of SB removed. she also has [id number] was seen by Dr. Gutierrez in 1/2017 per that visitbr"She reports that she was diagnosed with Crohn's ileocolitis at age 19 and in reviewing some of the records that she brings with her today, she has had multiple surgeries with ~68cm of resected small bowel. She reports that her last surgery was a little over 10yrs ago.Lanette most recent colonoscope was done in 6/2015 (while living in [place]) and was remarkable for an ileo-colonic anastomosis in the ascending colon which was patent - however, the anastomosis and her caleb-TI contained a few patchy non-bleeding erosions.brPer her report, she has only been on 5-ASA medications - previously Pentasa but currently Sulfasalazine. On this regimen, she has been having 1-3 formed BMs/day without any GI tract bleeding symptoms or abdominal pain/cramping. She reports that her weight has been stable and her appetite has been good. She also reports that she has been diagnosed with osteoporosis and has been on Prolia but could not tolerate it - she is going to have a follow-up DEXA scan through 's office in April."She did not follow up on Dr. Gutierrez's recommendation for work up and treatment.She has been seeing Dr. Meneses in [facility]. Her last surgery was 1994. She flared around 2012. She has tried to stay off biologics. She had another flare in June and was on prednisone since June to Aug. When she flares she gets bad abdominal pain and obstructive symptoms. Her last colonoscopy was at least 4-5 years ago.She is currently on pentasa 6 pills a day. She is also on medicine for GERD (prevacid and pepcid) and has had a fundoplicationCurrently she has a BM 2-3 BM in the AM soft to water. No blood. Only mild pain after eating.Interval History:11/18/2021She has been flaring, having some RLQ pain and diarrhea. Sometimes she gets it into the back and into the perineum. She is watching her diet.brShe hasn't seen any blood in th estool.Riana is taking pentasa 6 pills a day. when she tried to cut down dose she felt like she got worse.brInterval History:3/3/2022bAndrae received initial dose of entyvio 12/23brShe has had 3 doses. she is now on the 8 week maintenance.Riana is starting to feel a little better.Riana is getting it done at 12 [address] in  which is working well for us she doesn't pay anything.Riana is off budesonide.Riana is still taking pentasa 6 pills ad ay.Riana is still having some right pelvic pain that she feels like is improving.Riana is still having loose BM, diarrhea is not as often. It was intiailly 4-5 times a day and now twice a daybrInterval History:5/23/2022brvit D and b12 were normalbrFC was 130brI asked her to remain on pentasa and start xifaxinbrHer last infusion was about 8 weeks ago. She felt like that helped her back and perineal pain is better.Riana has had 4 URI infections on entyvio but she has also had lots of exposures bc [facility] and kids are in .Riana was seen in urgent care and all covid, strep, flu rudi were negative.Riana is also having some diarrhea and pain.Riana did not notice any difference with the xifaxin. she is taking pentasa 6 pills a daybrIn the last couple days she has had up to 10 BM a day. No blood in the stool.brInterval History:7/28/2022br5/22 C diff negbr5/22 CTE with actuvie disease in the neoTIbr7/22 colonoscopy with ileocolonic anastamosis and multiple erosions/ulcers on the ileal side and at anastmoisis. THe colonic mucosa was normal brLast entyvio infusion was 5/22brHer pain has been a little better. She is still having diarrhea. Her rectum gets irritated and sore. Riana has had a squamous cell cancer on her nose and thigh. She goes once a year for checks and have things removed from time to time. Riana is taking the pentasa 6 pills a day.    br  Plan from last visit:
br
br start Radha covarrubias should reach out to youbr- start entocort 3 pills a day. Once you have the stellara infusion then decrease to 2 pills a day for 2 weeks and then 1 pill a day for 2 weeks.br- Please decrease your pentasa down to 4 pills a day. Once you are feeling better I would like to wean to two then off.).br- please see your dermatologist within 6 months of stelara startbr- please follow up with me in 4 months Interval History:  11/28/2022   
br   In septeber she had the infusions and she felt better right away.  
br She finished the entocort.  She is still on 6 pills a day of pentasa. 
br 11/9 she had her first injection. She was a week late and was starting to have a little diarrhea. 
brShe felt that it did help. 
br Starting less then a week agao she had some aching in her rectum.  She has had a little diarrhea as well.  
br She tried some aquaphore a little. 
br 
She is not noticing URI symptoms on stelara (was having a lot of that on entyvio)br
br She is currently taking a multivitamin.  zinc, vitamin D, vitamin C and calcium 
br
br She is on lanxoprazole 30My nurse has reviewed and updated the medication list with the patient (medication reconciliation). I have also reviewed the medication list. New updates were made to the patient's medical, social and family history. Pertinent details are also noted above in the HPI.br visited="true"

## 2022-11-28 NOTE — SERVICENOTES
Our goal is to partner with you to improve your health and well being. It is important for you to complete necessary testing and follow the instructions given to you at your clinic visit. Our office will call you within 2 weeks with results of any testing but you may also call sooner to obtain results [phone].   If you have any questions or concerns please feel free to call.  We take your care very seriously and we thank you for your trust!
- procotofoam or steroid suppository twice a day for 7 days
- labs today
- stool studies in 3-6 weeks
- follow up in 2-4  months
- get flu vaccine

## 2023-03-01 ENCOUNTER — AMBULATORY SURGICAL CENTER (OUTPATIENT)
Dept: URBAN - METROPOLITAN AREA SURGERY 19 | Facility: SURGERY | Age: 68
End: 2023-03-01
Payer: COMMERCIAL

## 2023-03-01 ENCOUNTER — OFFICE (OUTPATIENT)
Dept: URBAN - METROPOLITAN AREA PATHOLOGY 24 | Facility: PATHOLOGY | Age: 68
End: 2023-03-01
Payer: COMMERCIAL

## 2023-03-01 DIAGNOSIS — K50.10 CROHN'S DISEASE OF LARGE INTESTINE WITHOUT COMPLICATIONS: ICD-10-CM

## 2023-03-01 DIAGNOSIS — R13.10 DYSPHAGIA, UNSPECIFIED: ICD-10-CM

## 2023-03-01 PROCEDURE — 43249 ESOPH EGD DILATION <30 MM: CPT | Mod: 51 | Performed by: INTERNAL MEDICINE

## 2023-03-01 PROCEDURE — 45380 COLONOSCOPY AND BIOPSY: CPT | Performed by: INTERNAL MEDICINE

## 2023-03-01 PROCEDURE — 88342 IMHCHEM/IMCYTCHM 1ST ANTB: CPT | Performed by: STUDENT IN AN ORGANIZED HEALTH CARE EDUCATION/TRAINING PROGRAM

## 2023-03-01 PROCEDURE — 88305 TISSUE EXAM BY PATHOLOGIST: CPT | Performed by: STUDENT IN AN ORGANIZED HEALTH CARE EDUCATION/TRAINING PROGRAM

## 2023-04-03 ENCOUNTER — OFFICE (OUTPATIENT)
Dept: URBAN - METROPOLITAN AREA CLINIC 72 | Facility: CLINIC | Age: 68
End: 2023-04-03
Payer: COMMERCIAL

## 2023-04-03 VITALS
SYSTOLIC BLOOD PRESSURE: 128 MMHG | HEART RATE: 77 BPM | HEIGHT: 59 IN | DIASTOLIC BLOOD PRESSURE: 74 MMHG | RESPIRATION RATE: 14 BRPM | WEIGHT: 123 LBS

## 2023-04-03 DIAGNOSIS — Z85.89 PERSONAL HISTORY OF MALIGNANT NEOPLASM OF OTHER ORGANS AND S: ICD-10-CM

## 2023-04-03 DIAGNOSIS — Z92.29 PERSONAL HISTORY OF OTHER DRUG THERAPY: ICD-10-CM

## 2023-04-03 DIAGNOSIS — E53.8 DEFICIENCY OF OTHER SPECIFIED B GROUP VITAMINS: ICD-10-CM

## 2023-04-03 DIAGNOSIS — K29.00 ACUTE GASTRITIS WITHOUT BLEEDING: ICD-10-CM

## 2023-04-03 DIAGNOSIS — K21.9 GASTRO-ESOPHAGEAL REFLUX DISEASE WITHOUT ESOPHAGITIS: ICD-10-CM

## 2023-04-03 DIAGNOSIS — M81.0 AGE-RELATED OSTEOPOROSIS WITHOUT CURRENT PATHOLOGICAL FRACTU: ICD-10-CM

## 2023-04-03 DIAGNOSIS — K50.818 CROHN'S DISEASE OF BOTH SMALL AND LARGE INTESTINE WITH OTHER: ICD-10-CM

## 2023-04-03 DIAGNOSIS — E55.9 VITAMIN D DEFICIENCY, UNSPECIFIED: ICD-10-CM

## 2023-04-03 PROCEDURE — 99215 OFFICE O/P EST HI 40 MIN: CPT | Performed by: INTERNAL MEDICINE

## 2023-04-03 NOTE — SERVICEHPINOTES
Denisa Daily   is seen today for a follow-up visit.  
br
br67 year old female with small crohn's disease since her 20's, remote smoking (none since 1977) status post ileocacal resection and SB surgery with about 68 cm of SB removed. she also has [id number] was seen by Dr. Gutierrez in 1/2017 per that visitbr"She reports that she was diagnosed with Crohn's ileocolitis at age 19 and in reviewing some of the records that she brings with her today, she has had multiple surgeries with ~68cm of resected small bowel. She reports that her last surgery was a little over 10yrs ago.Lanette most recent colonoscope was done in 6/2015 (while living in [place]) and was remarkable for an ileo-colonic anastomosis in the ascending colon which was patent - however, the anastomosis and her caleb-TI contained a few patchy non-bleeding erosions.brPer her report, she has only been on 5-ASA medications - previously Pentasa but currently Sulfasalazine. On this regimen, she has been having 1-3 formed BMs/day without any GI tract bleeding symptoms or abdominal pain/cramping. She reports that her weight has been stable and her appetite has been good. She also reports that she has been diagnosed with osteoporosis and has been on Prolia but could not tolerate it - she is going to have a follow-up DEXA scan through 's office in April."She did not follow up on Dr. Gutierrez's recommendation for work up and treatment.She has been seeing Dr. Meneses in [facility]. Her last surgery was 1994. She flared around 2012. She has tried to stay off biologics. She had another flare in June and was on prednisone since June to Aug. When she flares she gets bad abdominal pain and obstructive symptoms. Her last colonoscopy was at least 4-5 years ago.She is currently on pentasa 6 pills a day. She is also on medicine for GERD (prevacid and pepcid) and has had a fundoplicationCurrently she has a BM 2-3 BM in the AM soft to water. No blood. Only mild pain after eating.Interval History:11/18/2021She has been flaring, having some RLQ pain and diarrhea. Sometimes she gets it into the back and into the perineum. She is watching her diet.brShe hasn't seen any blood in th estool.Riana is taking pentasa 6 pills a day. when she tried to cut down dose she felt like she got worse.brInterval History:3/3/2022bAndrae received initial dose of entyvio 12/23brShe has had 3 doses. she is now on the 8 week maintenance.Riana is starting to feel a little better.Riana is getting it done at 12 [place] in  which is working well for us she doesn't pay anything.Riana is off budesonide.Riana is still taking pentasa 6 pills ad ay.Riana is still having some right pelvic pain that she feels like is improving.Riana is still having loose BM, diarrhea is not as often. It was intiailly 4-5 times a day and now twice a daybrInterval History:5/23/2022brvit D and b12 were normalbrFC was 130brI asked her to remain on pentasa and start xifaxinbrHer last infusion was about 8 weeks ago. She felt like that helped her back and perineal pain is better.Riana has had 4 URI infections on entyvio but she has also had lots of exposures bc [facility] and kids are in .Riana was seen in urgent care and all covid, strep, flu rudi were negative.Riana is also having some diarrhea and pain.Riana did not notice any difference with the xifaxin. she is taking pentasa 6 pills a daybrIn the last couple days she has had up to 10 BM a day. No blood in the stool.brInterval History:7/28/2022br5/22 C diff negbr5/22 CTE with actuvie disease in the neoTIbr7/22 colonoscopy with ileocolonic anastamosis and multiple erosions/ulcers on the ileal side and at anastmoisis. THe colonic mucosa was normalbrLast entyvio infusion was 5/22brHer pain has been a little better. She is still having diarrhea. 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 She goes once a year for checks and have things removed from time to time.Riana is taking the pentasa 6 pills a day.brInterval History:11/28/2022brIn septeber she had the infusions and she felt better right away. brSdarion finished the entocort. She is still on 6 pills a day of pentasa.br11/9 she had her first injection. She was a week late and was starting to have a little diarrhea. brShe felt that it did help. brStarting less then a week agao she had some aching in her rectum. She has had a little diarrhea as well. brShe tried some aquaphore a little. brShe is not noticing URI symptoms on stelara (was having a lot of that on entyvio)She is currently taking a multivitamin. zinc, vitamin D, vitamin C and calcium She is on lanxoprazole 30br    br  Plan from last visit:
br procotofoam or steroid suppository twice a day for 7 daysbr- labs todaybr- stool studies in 3-6 weeksbr- follow up in 2-4 monthsbr- get flu vaccine Interval History:  4/3/2023   
br   MRE and CT with disease activity in the colon and small bowel
br 
I started budesonide mid januarybr colonoscopy wtih disease at the neoTI anastamosis but non once I entered caleb TI.  there was a possible stricture. 
br FC was 230. 
brShe had some dsysphagia and tightness at GE junction was dilated to 18. 
br She went down to 4 pills a day of pentasa. 
br She is on 2 budesondebr She decreased coffee and decreased wine
br She had the IV and 2 injections of the stelarabr
br She went to the ER 12 days after taking the 2nd stelara injection.  Because the imaging saw lots of inflammation, she therefor stopped the stellara.  
br

br In terms of the gastritis/GERD she is on lansoprazole 30 and famatodine 40.  She has tried nexium, protonix, prilosec and they didn't help.  Her insurance wouldn't pay for dexilant. 
br
br She has cataracts in both eyes and would like to get them fixed. br My nurse has reviewed and updated the medication list with the patient (medication reconciliation). I have also reviewed the medication list. New updates were made to the patient's medical, social and family history. Pertinent details are also noted above in the HPI.br visited="true"

## 2023-04-03 NOTE — SERVICENOTES
Our goal is to partner with you to improve your health and well being. It is important for you to complete necessary testing and follow the instructions given to you at your clinic visit. Our office will call you within 2 weeks with results of any testing but you may also call sooner to obtain results [phone].   If you have any questions or concerns please feel free to call.  We take your care very seriously and we thank you for your trust!
- stop the pentasa (wean down over a month or two)
- start skirizi (Ill reach out to alfred about starting it)
- one week after first infusion of skirizi, decrease to 1 pill 
- one week after the second infusion stop the budesonide
- let me know if that is not working

## 2023-04-05 ENCOUNTER — APPOINTMENT (OUTPATIENT)
Dept: URBAN - METROPOLITAN AREA SURGERY 12 | Age: 68
Setting detail: DERMATOLOGY
End: 2023-04-05

## 2023-04-05 DIAGNOSIS — Z85.828 PERSONAL HISTORY OF OTHER MALIGNANT NEOPLASM OF SKIN: ICD-10-CM

## 2023-04-05 DIAGNOSIS — L81.4 OTHER MELANIN HYPERPIGMENTATION: ICD-10-CM

## 2023-04-05 DIAGNOSIS — D22 MELANOCYTIC NEVI: ICD-10-CM

## 2023-04-05 DIAGNOSIS — L57.8 OTHER SKIN CHANGES DUE TO CHRONIC EXPOSURE TO NONIONIZING RADIATION: ICD-10-CM

## 2023-04-05 DIAGNOSIS — L82.1 OTHER SEBORRHEIC KERATOSIS: ICD-10-CM

## 2023-04-05 DIAGNOSIS — D18.0 HEMANGIOMA: ICD-10-CM

## 2023-04-05 PROBLEM — D22.5 MELANOCYTIC NEVI OF TRUNK: Status: ACTIVE | Noted: 2023-04-05

## 2023-04-05 PROBLEM — D18.01 HEMANGIOMA OF SKIN AND SUBCUTANEOUS TISSUE: Status: ACTIVE | Noted: 2023-04-05

## 2023-04-05 PROCEDURE — OTHER REASSURANCE: OTHER

## 2023-04-05 PROCEDURE — 99213 OFFICE O/P EST LOW 20 MIN: CPT

## 2023-04-05 PROCEDURE — OTHER SUNSCREEN RECOMMENDATIONS: OTHER

## 2023-04-05 PROCEDURE — OTHER MIPS QUALITY: OTHER

## 2023-04-05 PROCEDURE — OTHER COUNSELING: OTHER

## 2023-04-05 ASSESSMENT — LOCATION DETAILED DESCRIPTION DERM
LOCATION DETAILED: LEFT MID-UPPER BACK
LOCATION DETAILED: RIGHT SUPERIOR UPPER BACK
LOCATION DETAILED: LEFT SUPERIOR UPPER BACK

## 2023-04-05 ASSESSMENT — LOCATION SIMPLE DESCRIPTION DERM
LOCATION SIMPLE: LEFT UPPER BACK
LOCATION SIMPLE: RIGHT UPPER BACK

## 2023-04-05 ASSESSMENT — LOCATION ZONE DERM: LOCATION ZONE: TRUNK

## 2023-04-05 NOTE — PROCEDURE: MIPS QUALITY
Quality 226: Preventive Care And Screening: Tobacco Use: Screening And Cessation Intervention: Patient screened for tobacco use and is an ex/non-smoker
Detail Level: Detailed
Quality 111:Pneumonia Vaccination Status For Older Adults: Pneumococcal vaccine (PPSV23) administered on or after patient’s 60th birthday and before the end of the measurement period
Quality 110: Preventive Care And Screening: Influenza Immunization: Influenza Immunization previously received during influenza season

## 2023-04-18 ENCOUNTER — OFFICE (OUTPATIENT)
Dept: URBAN - METROPOLITAN AREA CLINIC 67 | Facility: CLINIC | Age: 68
End: 2023-04-18
Payer: COMMERCIAL

## 2023-04-18 VITALS
RESPIRATION RATE: 14 BRPM | OXYGEN SATURATION: 97 % | SYSTOLIC BLOOD PRESSURE: 116 MMHG | DIASTOLIC BLOOD PRESSURE: 63 MMHG | SYSTOLIC BLOOD PRESSURE: 122 MMHG | HEART RATE: 66 BPM | TEMPERATURE: 97.9 F | OXYGEN SATURATION: 98 % | SYSTOLIC BLOOD PRESSURE: 108 MMHG | WEIGHT: 124 LBS | HEART RATE: 70 BPM | DIASTOLIC BLOOD PRESSURE: 79 MMHG | HEART RATE: 68 BPM | DIASTOLIC BLOOD PRESSURE: 72 MMHG | HEIGHT: 59 IN

## 2023-04-18 DIAGNOSIS — K50.90 CROHN'S DISEASE, UNSPECIFIED, WITHOUT COMPLICATIONS: ICD-10-CM

## 2023-04-18 PROCEDURE — 96365 THER/PROPH/DIAG IV INF INIT: CPT | Performed by: INTERNAL MEDICINE

## 2023-04-18 RX ADMIN — RISANKIZUMAB-RZAA 600 MG: 60 INJECTION INTRAVENOUS at 13:18

## 2023-04-18 NOTE — SERVICEHPINOTES
See follow-up visit from:   4/3/2023   
br   Date &amp Results of last TB test:   4/3/2023     Negative   
br   Labs and/or Additional Orders: n/abr Insurance authorization: no PA requiredbr Pharmacy:   [facility]   
br   Rate of Infusion:  Standard  brInfusion order placed on:  4/3/2023   
br 
Time out performed with:  Iesha Puga
br

## 2023-05-16 ENCOUNTER — OFFICE (OUTPATIENT)
Dept: URBAN - METROPOLITAN AREA CLINIC 67 | Facility: CLINIC | Age: 68
End: 2023-05-16
Payer: COMMERCIAL

## 2023-05-16 VITALS
TEMPERATURE: 97.6 F | WEIGHT: 123 LBS | DIASTOLIC BLOOD PRESSURE: 77 MMHG | HEIGHT: 59 IN | SYSTOLIC BLOOD PRESSURE: 128 MMHG | TEMPERATURE: 98.2 F | DIASTOLIC BLOOD PRESSURE: 83 MMHG | OXYGEN SATURATION: 97 % | SYSTOLIC BLOOD PRESSURE: 133 MMHG | RESPIRATION RATE: 14 BRPM | HEART RATE: 65 BPM | HEART RATE: 76 BPM | DIASTOLIC BLOOD PRESSURE: 78 MMHG | SYSTOLIC BLOOD PRESSURE: 124 MMHG | OXYGEN SATURATION: 96 % | HEART RATE: 74 BPM

## 2023-05-16 DIAGNOSIS — K50.90 CROHN'S DISEASE, UNSPECIFIED, WITHOUT COMPLICATIONS: ICD-10-CM

## 2023-05-16 PROCEDURE — 96413 CHEMO IV INFUSION 1 HR: CPT | Performed by: INTERNAL MEDICINE

## 2023-05-16 RX ADMIN — RISANKIZUMAB-RZAA 600 MG: 60 INJECTION INTRAVENOUS at 10:50

## 2023-05-16 NOTE — SERVICENOTES
Patient observed for 15 minutes post infusion.  Patient denies chest pain, shortness of breath, or dizziness.  Handout given and reviewed with patient.  Instructed on common side effects.  Patient has no questions.
No

## 2023-06-15 ENCOUNTER — APPOINTMENT (OUTPATIENT)
Dept: URBAN - METROPOLITAN AREA SURGERY 12 | Age: 68
Setting detail: DERMATOLOGY
End: 2023-06-19

## 2023-06-15 DIAGNOSIS — L57.0 ACTINIC KERATOSIS: ICD-10-CM

## 2023-06-15 PROBLEM — D48.5 NEOPLASM OF UNCERTAIN BEHAVIOR OF SKIN: Status: ACTIVE | Noted: 2023-06-15

## 2023-06-15 PROCEDURE — OTHER MIPS QUALITY: OTHER

## 2023-06-15 PROCEDURE — OTHER BIOPSY BY SHAVE METHOD: OTHER

## 2023-06-15 PROCEDURE — 11102 TANGNTL BX SKIN SINGLE LES: CPT

## 2023-06-15 ASSESSMENT — LOCATION DETAILED DESCRIPTION DERM: LOCATION DETAILED: NASAL DORSUM

## 2023-06-15 ASSESSMENT — LOCATION SIMPLE DESCRIPTION DERM: LOCATION SIMPLE: NOSE

## 2023-06-15 ASSESSMENT — LOCATION ZONE DERM: LOCATION ZONE: NOSE

## 2023-06-19 ENCOUNTER — OFFICE (OUTPATIENT)
Dept: URBAN - METROPOLITAN AREA CLINIC 72 | Facility: CLINIC | Age: 68
End: 2023-06-19
Payer: COMMERCIAL

## 2023-06-19 VITALS
OXYGEN SATURATION: 92 % | DIASTOLIC BLOOD PRESSURE: 72 MMHG | WEIGHT: 125 LBS | HEIGHT: 59 IN | HEART RATE: 64 BPM | SYSTOLIC BLOOD PRESSURE: 128 MMHG

## 2023-06-19 DIAGNOSIS — D84.9 IMMUNODEFICIENCY, UNSPECIFIED: ICD-10-CM

## 2023-06-19 DIAGNOSIS — B25.9 CYTOMEGALOVIRAL DISEASE, UNSPECIFIED: ICD-10-CM

## 2023-06-19 DIAGNOSIS — K82.8 OTHER SPECIFIED DISEASES OF GALLBLADDER: ICD-10-CM

## 2023-06-19 DIAGNOSIS — K50.819 CROHN'S DISEASE OF BOTH SMALL AND LARGE INTESTINE WITH UNSPE: ICD-10-CM

## 2023-06-19 DIAGNOSIS — R74.01 ELEVATION OF LEVELS OF LIVER TRANSAMINASE LEVELS: ICD-10-CM

## 2023-06-19 PROCEDURE — 99214 OFFICE O/P EST MOD 30 MIN: CPT | Performed by: INTERNAL MEDICINE

## 2023-06-19 NOTE — SERVICEHPINOTES
Denisa Daily   is seen today for a follow-up visit.  
br67 year old female with small crohn's disease since her 20's, remote smoking (none since 1977) status post ileocacal resection and SB surgery with about 68 cm of SB removed. she also has [id number] was seen by Dr. Gutierrez in 1/2017 per that visitbr"She reports that she was diagnosed with Crohn's ileocolitis at age 19 and in reviewing some of the records that she brings with her today, she has had multiple surgeries with ~68cm of resected small bowel. She reports that her last surgery was a little over 10yrs ago.Lanette most recent colonoscope was done in 6/2015 (while living in [place]) and was remarkable for an ileo-colonic anastomosis in the ascending colon which was patent - however, the anastomosis and her caleb-TI contained a few patchy non-bleeding erosions.brPer her report, she has only been on 5-ASA medications - previously Pentasa but currently Sulfasalazine. On this regimen, she has been having 1-3 formed BMs/day without any GI tract bleeding symptoms or abdominal pain/cramping. She reports that her weight has been stable and her appetite has been good. She also reports that she has been diagnosed with osteoporosis and has been on Prolia but could not tolerate it - she is going to have a follow-up DEXA scan through 's office in April."She did not follow up on Dr. Gutierrez's recommendation for work up and treatment.She has been seeing Dr. Meneses in [facility]. Her last surgery was 1994. She flared around 2012. She has tried to stay off biologics. She had another flare in June and was on prednisone since June to Aug. When she flares she gets bad abdominal pain and obstructive symptoms. Her last colonoscopy was at least 4-5 years ago.She is currently on pentasa 6 pills a day. She is also on medicine for GERD (prevacid and pepcid) and has had a fundoplicationCurrently she has a BM 2-3 BM in the AM soft to water. No blood. Only mild pain after eating.Interval History:11/18/2021She has been flaring, having some RLQ pain and diarrhea. Sometimes she gets it into the back and into the perineum. She is watching her diet.brShe hasn't seen any blood in th estool.Riana is taking pentasa 6 pills a day. when she tried to cut down dose she felt like she got worse.brInterval History:3/3/2022bAndrae received initial dose of entyvio 12/23brShe has had 3 doses. she is now on the 8 week maintenance.Riana is starting to feel a little better.Riana is getting it done at [facility] in  which is working well for us she doesn't pay anything.Riana is off budesonide.Riana is still taking pentasa 6 pills ad ay.Riana is still having some right pelvic pain that she feels like is improving.Riana is still having loose BM, diarrhea is not as often. It was initially 4-5 times a day and now twice a daybrInterval History:5/23/2022brvit D and b12 were normalbrFC was 130brI asked her to remain on pentasa and start xifaxanbrHer last infusion was about 8 weeks ago. She felt like that helped her back and perineal pain is better.Riana has had 4 URI infections on entyvio but she has also had lots of exposures bc [facility] and kids are in .Riana was seen in urgent care and all covid, strep, flu test were negative.Riana is also having some diarrhea and pain.Riana did not notice any difference with the xifaxan. she is taking pentasa 6 pills a daybrIn the last couple days she has had up to 10 BM a day. No blood in the stool.brInterval History:7/28/2022br5/22 C diff negbr5/22 CTE with active disease in the neoTIbr7/22 colonoscopy with ileocolonic anastomosis and multiple erosions/ulcers on the ileal side and at anastomoses. THe colonic mucosa was normalbrLast entyvio infusion was 5/22brHer pain has been a little better. She is still having diarrhea. 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 She goes once a year for checks and have things removed from time to time.Riana is taking the pentasa 6 pills a day.brInterval History:11/28/2022brIn September she had the infusions and she felt better right away.Riana finished the entocort. She is still on 6 pills a day of pentasa.br11/9 she had her first injection. She was a week late and was starting to have a little diarrhea.Riana felt that it did help.brStarting less then a week ago she had some aching in her rectum. She has had a little diarrhea as well.Candidahe tried some Aquaphor a little.Riana is not noticing URI symptoms on stelara (was having a lot of that on entyvio)She is currently taking a multivitamin. zinc, vitamin D, vitamin C and calciumShe is on lansoprazole 30Interval History:4/3/2023brMRE and CT with disease activity in the colon and small bowelbrI started budesonide mid januarybrcolonoscopy with disease at the neoTI anastomosis but non once I entered caleb TI. there was a possible stricture.brFC was 230.Riana had some dysphagia and tightness at GE junction was dilated to 18. Candidahe went down to 4 pills a day of pentasa. Riana is on 2 budesonidebrShe decreased coffee and decreased winebrShe had the IV and 2 injections of the stelaraShe went to the ER 12 days after taking the 2nd stelara injection. Because the imaging saw lots of inflammation, she therefor stopped the stellara. In terms of the gastritis/GERD she is on lansoprazole 30 and famotidine 40. She has tried nexium, protonix, prilosec and they didn't help. Her insurance wouldn't pay for dexilant. She has cataracts in both eyes and would like to get them fixed.    br  Plan from last visit:
br- stop the pentasa (wean down over a month or two)br- start skirizi (Ill reach out to alfred about starting it)br- one week after first infusion of skirizi, decrease to 1 pill br- one week after the second infusion stop the budesonidebr- let me know if that is not working Interval History:  6/19/2023  
Riana was doing great on skirizi, feeling really good then starting having fatigue, fevers, some mild headache. 
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  She really didn't have diarrhea till they started her on antibiotics.  
brThe diarrhea got better when they stopped the antibiotics.  NOw she is having diarrhea but it is not as bad and controlled with 2 Imodium a day.  br   She is due to follow up with Dr. Lou in 3 weeks.  She will remain on ganciclovir during that time.
br 
She is slowly getting her energy back.  She is having a little discomfort with taking a deep breath but that is improving with exercise and flonase.  br My nurse has reviewed and updated the medication list with the patient (medication reconciliation). I have also reviewed the medication list. New updates were made to the patient's medical, social and family history. Pertinent details are also noted above in the HPI.br visited="true"

## 2023-06-19 NOTE — SERVICENOTES
Our goal is to partner with you to improve your health and well being. It is important for you to complete necessary testing and follow the instructions given to you at your clinic visit. Our office will call you within 2 weeks with results of any testing but you may also call sooner to obtain results [phone].   If you have any questions or concerns please feel free to call.  We take your care very seriously and we thank you for your trust!
- labs now
- we will plan to start back on skirizi once all symptoms have resolved and viremia has resolved. LIkely will need reinduction.
- we will decide on plan once I get labs from Dr. Lou. 
- follow up in 1-2 months

## 2023-07-11 ENCOUNTER — APPOINTMENT (OUTPATIENT)
Dept: URBAN - METROPOLITAN AREA SURGERY 12 | Age: 68
Setting detail: DERMATOLOGY
End: 2023-07-11

## 2023-07-11 DIAGNOSIS — L57.0 ACTINIC KERATOSIS: ICD-10-CM

## 2023-07-11 PROCEDURE — 17000 DESTRUCT PREMALG LESION: CPT

## 2023-07-11 PROCEDURE — 17003 DESTRUCT PREMALG LES 2-14: CPT

## 2023-07-11 PROCEDURE — OTHER LIQUID NITROGEN: OTHER

## 2023-07-11 ASSESSMENT — LOCATION ZONE DERM: LOCATION ZONE: NOSE

## 2023-07-11 ASSESSMENT — LOCATION SIMPLE DESCRIPTION DERM: LOCATION SIMPLE: NOSE

## 2023-07-11 ASSESSMENT — LOCATION DETAILED DESCRIPTION DERM
LOCATION DETAILED: NASAL DORSUM
LOCATION DETAILED: NASAL ROOT

## 2023-08-02 ENCOUNTER — OFFICE (OUTPATIENT)
Dept: URBAN - METROPOLITAN AREA CLINIC 67 | Facility: CLINIC | Age: 68
End: 2023-08-02
Payer: COMMERCIAL

## 2023-08-02 VITALS
TEMPERATURE: 98 F | DIASTOLIC BLOOD PRESSURE: 63 MMHG | SYSTOLIC BLOOD PRESSURE: 117 MMHG | HEART RATE: 72 BPM | DIASTOLIC BLOOD PRESSURE: 78 MMHG | WEIGHT: 120 LBS | TEMPERATURE: 97.7 F | OXYGEN SATURATION: 97 % | RESPIRATION RATE: 14 BRPM | HEART RATE: 66 BPM | OXYGEN SATURATION: 95 % | SYSTOLIC BLOOD PRESSURE: 118 MMHG | HEART RATE: 74 BPM | SYSTOLIC BLOOD PRESSURE: 111 MMHG | HEIGHT: 59 IN | DIASTOLIC BLOOD PRESSURE: 60 MMHG

## 2023-08-02 DIAGNOSIS — K50.90 CROHN'S DISEASE, UNSPECIFIED, WITHOUT COMPLICATIONS: ICD-10-CM

## 2023-08-02 PROCEDURE — 96413 CHEMO IV INFUSION 1 HR: CPT | Performed by: INTERNAL MEDICINE

## 2023-08-02 RX ADMIN — RISANKIZUMAB-RZAA 600 MG: 60 INJECTION INTRAVENOUS at 13:20

## 2023-08-02 NOTE — SERVICEHPINOTES
See follow-up visit from:   6/19/2023   
br   Date &amp Results of last TB test:   4/3/2023     Negative   
br   Labs and/or Additional Orders: n/abr Insurance authorization: no PA requiredbr Pharmacy:   [facility]   
br   Rate of Infusion:  Standard  brInfusion order placed on:  7/20/2023   
br 
Time out performed with:   Iesha Bang
br

## 2023-08-08 ENCOUNTER — OFFICE (OUTPATIENT)
Dept: URBAN - METROPOLITAN AREA CLINIC 72 | Facility: CLINIC | Age: 68
End: 2023-08-08
Payer: COMMERCIAL

## 2023-08-08 VITALS
RESPIRATION RATE: 14 BRPM | SYSTOLIC BLOOD PRESSURE: 132 MMHG | WEIGHT: 115 LBS | HEART RATE: 76 BPM | DIASTOLIC BLOOD PRESSURE: 78 MMHG | HEIGHT: 59 IN

## 2023-08-08 DIAGNOSIS — K50.818 CROHN'S DISEASE OF BOTH SMALL AND LARGE INTESTINE WITH OTHER: ICD-10-CM

## 2023-08-08 DIAGNOSIS — K21.9 GASTRO-ESOPHAGEAL REFLUX DISEASE WITHOUT ESOPHAGITIS: ICD-10-CM

## 2023-08-08 DIAGNOSIS — K82.8 OTHER SPECIFIED DISEASES OF GALLBLADDER: ICD-10-CM

## 2023-08-08 DIAGNOSIS — B25.9 CYTOMEGALOVIRAL DISEASE, UNSPECIFIED: ICD-10-CM

## 2023-08-08 DIAGNOSIS — C44.92 SQUAMOUS CELL CARCINOMA OF SKIN, UNSPECIFIED: ICD-10-CM

## 2023-08-08 PROCEDURE — 99214 OFFICE O/P EST MOD 30 MIN: CPT | Performed by: INTERNAL MEDICINE

## 2023-08-08 RX ORDER — SUCRALFATE 1 G/1
TABLET ORAL
Qty: 90 | Refills: 6 | Status: ACTIVE
Start: 2023-08-08

## 2023-08-08 NOTE — SERVICENOTES
Our goal is to partner with you to improve your health and well being. It is important for you to complete necessary testing and follow the instructions given to you at your clinic visit. Our office will call you within 2 weeks with results of any testing but you may also call sooner to obtain results [phone].   If you have any questions or concerns please feel free to call.  We take your care very seriously and we thank you for your trust!
- questions for Duncan is their opinion on  anti-TNF might be lower risk for CMV and squamous cell cancer.  Other options for medication?  Opinion from Dr. Lou and [facility] on decreasing to 1 pill a day of valcyte after induction of skrizi.
- consider additional work up for gallbladder if not improved on sucralfate.  I suspect the gallbladder dysfunction in the hospital was just due to how ill you were.  
- , place 1 sucralfate pill in about 1 ounce of water and let sit for 10 min then stir.  This will create a slurry, take it up to 3 times a day as needed for reflux, esophageal symptoms.  Try to separate from other medications by 2 hours.
- continue valcyte 2 pills a day for now, I may discuss with Dr. Lou decreasing it to 1 pill a day after induction
- follow up in 1 month

## 2023-08-30 ENCOUNTER — OFFICE (OUTPATIENT)
Dept: URBAN - METROPOLITAN AREA CLINIC 67 | Facility: CLINIC | Age: 68
End: 2023-08-30
Payer: COMMERCIAL

## 2023-08-30 VITALS
SYSTOLIC BLOOD PRESSURE: 135 MMHG | SYSTOLIC BLOOD PRESSURE: 122 MMHG | HEART RATE: 75 BPM | OXYGEN SATURATION: 97 % | WEIGHT: 114 LBS | HEART RATE: 72 BPM | DIASTOLIC BLOOD PRESSURE: 80 MMHG | DIASTOLIC BLOOD PRESSURE: 81 MMHG | DIASTOLIC BLOOD PRESSURE: 75 MMHG | HEIGHT: 59 IN | OXYGEN SATURATION: 96 % | HEART RATE: 73 BPM | RESPIRATION RATE: 14 BRPM | SYSTOLIC BLOOD PRESSURE: 117 MMHG | TEMPERATURE: 98 F

## 2023-08-30 DIAGNOSIS — K50.90 CROHN'S DISEASE, UNSPECIFIED, WITHOUT COMPLICATIONS: ICD-10-CM

## 2023-08-30 PROCEDURE — 96413 CHEMO IV INFUSION 1 HR: CPT | Performed by: INTERNAL MEDICINE

## 2023-08-30 RX ADMIN — RISANKIZUMAB-RZAA 600 MG: 60 INJECTION INTRAVENOUS at 13:35

## 2023-08-30 NOTE — SERVICEHPINOTES
See follow-up visit from:   8/8/2023   
br   Date &amp Results of last TB test:   4/3/2023     Negative   
br   Labs and/or Additional Orders: n/abr Insurance authorization:  no PA requiredbr Pharmacy:   [facility]   
br   Rate of Infusion:  Standard  brInfusion order placed on:  7/20/2023   
br 
Time out performed with:   Iesha Bang
br

## 2023-08-30 NOTE — SERVICENOTES
Patient declined observation period.  Patient denies chest pain, shortness of breath, or dizziness.  Handout given and reviewed with patient.  Instructed on common side effects.  Patient has no questions.

## 2023-09-15 ENCOUNTER — OFFICE (OUTPATIENT)
Dept: URBAN - METROPOLITAN AREA CLINIC 72 | Facility: CLINIC | Age: 68
End: 2023-09-15
Payer: COMMERCIAL

## 2023-09-15 VITALS
OXYGEN SATURATION: 99 % | DIASTOLIC BLOOD PRESSURE: 72 MMHG | HEIGHT: 59 IN | WEIGHT: 115 LBS | HEART RATE: 74 BPM | SYSTOLIC BLOOD PRESSURE: 120 MMHG

## 2023-09-15 DIAGNOSIS — D84.9 IMMUNODEFICIENCY, UNSPECIFIED: ICD-10-CM

## 2023-09-15 DIAGNOSIS — Z90.49 ACQUIRED ABSENCE OF OTHER SPECIFIED PARTS OF DIGESTIVE TRACT: ICD-10-CM

## 2023-09-15 DIAGNOSIS — Z86.19 PERSONAL HISTORY OF OTHER INFECTIOUS AND PARASITIC DISEASES: ICD-10-CM

## 2023-09-15 DIAGNOSIS — R14.0 ABDOMINAL DISTENSION (GASEOUS): ICD-10-CM

## 2023-09-15 DIAGNOSIS — E55.9 VITAMIN D DEFICIENCY, UNSPECIFIED: ICD-10-CM

## 2023-09-15 DIAGNOSIS — K21.9 GASTRO-ESOPHAGEAL REFLUX DISEASE WITHOUT ESOPHAGITIS: ICD-10-CM

## 2023-09-15 DIAGNOSIS — R13.19 OTHER DYSPHAGIA: ICD-10-CM

## 2023-09-15 DIAGNOSIS — K50.818 CROHN'S DISEASE OF BOTH SMALL AND LARGE INTESTINE WITH OTHER: ICD-10-CM

## 2023-09-15 PROCEDURE — 99214 OFFICE O/P EST MOD 30 MIN: CPT | Performed by: INTERNAL MEDICINE

## 2023-09-15 NOTE — SERVICENOTES
Our goal is to partner with you to improve your health and well being. It is important for you to complete necessary testing and follow the instructions given to you at your clinic visit. Our office will call you within 2 weeks with results of any testing but you may also call sooner to obtain results [phone].   If you have any questions or concerns please feel free to call
.  We take your care very seriously and we thank you for your trust!
- schedule EGD (biopsy for eoe, biopsy for disacchridase, celiac and HP) 
- schedule US
- get labs with next infusion
- follow up in january 2024 or sooner if you don't continue to improve
- If pain not better then may need to repeat HIDA and get surgical consult. 
- plan for colonoscopy in 2/2024 to see how disease is looking

## 2023-09-28 ENCOUNTER — OFFICE (OUTPATIENT)
Dept: URBAN - METROPOLITAN AREA CLINIC 67 | Facility: CLINIC | Age: 68
End: 2023-09-28
Payer: COMMERCIAL

## 2023-09-28 VITALS
WEIGHT: 116 LBS | HEART RATE: 75 BPM | HEART RATE: 64 BPM | OXYGEN SATURATION: 97 % | DIASTOLIC BLOOD PRESSURE: 77 MMHG | HEIGHT: 59 IN | DIASTOLIC BLOOD PRESSURE: 76 MMHG | SYSTOLIC BLOOD PRESSURE: 120 MMHG | TEMPERATURE: 97.7 F | SYSTOLIC BLOOD PRESSURE: 115 MMHG | TEMPERATURE: 97.9 F | RESPIRATION RATE: 14 BRPM | HEART RATE: 67 BPM | OXYGEN SATURATION: 99 %

## 2023-09-28 DIAGNOSIS — K50.90 CROHN'S DISEASE, UNSPECIFIED, WITHOUT COMPLICATIONS: ICD-10-CM

## 2023-09-28 PROCEDURE — 96413 CHEMO IV INFUSION 1 HR: CPT | Performed by: INTERNAL MEDICINE

## 2023-09-28 RX ADMIN — RISANKIZUMAB-RZAA 600 MG: 60 INJECTION INTRAVENOUS at 16:21

## 2023-09-28 NOTE — SERVICEHPINOTES
See follow-up visit from:   9/15/2023   
br   Date &amp Results of last TB test:   4/3/2023     Negative   
br   Labs and/or Additional Orders: CMP/CBC/Vit D/folate/Vit B12br Insurance authorization:  no PA requiredbr Pharmacy:   [facility]   
br   Rate of Infusion:  Standard  brInfusion order placed on:  7/20/2023   
br 
Time out performed with:   Iesha Puga
br

## 2023-09-28 NOTE — SERVICENOTES
Patient declined observation period.  Patient denies chest pain, shortness of breath, or dizziness.  Handout given and reviewed with patient.  Instructed on common side effects.  Patient has no questions.  Unable to get labs.  Patient will return on 10/2/23 for lab draw.

## 2023-10-03 LAB
CBC WITH DIFFERENTIAL/PLATELET: BASO (ABSOLUTE): 0.1 X10E3/UL (ref 0–0.2)
CBC WITH DIFFERENTIAL/PLATELET: BASOS: 1 %
CBC WITH DIFFERENTIAL/PLATELET: EOS (ABSOLUTE): 0.1 X10E3/UL (ref 0–0.4)
CBC WITH DIFFERENTIAL/PLATELET: EOS: 2 %
CBC WITH DIFFERENTIAL/PLATELET: HEMATOCRIT: 41.8 % (ref 34–46.6)
CBC WITH DIFFERENTIAL/PLATELET: HEMATOLOGY COMMENTS: (no result)
CBC WITH DIFFERENTIAL/PLATELET: HEMOGLOBIN: 13.6 G/DL (ref 11.1–15.9)
CBC WITH DIFFERENTIAL/PLATELET: IMMATURE CELLS: (no result)
CBC WITH DIFFERENTIAL/PLATELET: IMMATURE GRANS (ABS): 0 X10E3/UL (ref 0–0.1)
CBC WITH DIFFERENTIAL/PLATELET: IMMATURE GRANULOCYTES: 0 %
CBC WITH DIFFERENTIAL/PLATELET: LYMPHS (ABSOLUTE): 2.8 X10E3/UL (ref 0.7–3.1)
CBC WITH DIFFERENTIAL/PLATELET: LYMPHS: 44 %
CBC WITH DIFFERENTIAL/PLATELET: MCH: 31.2 PG (ref 26.6–33)
CBC WITH DIFFERENTIAL/PLATELET: MCHC: 32.5 G/DL (ref 31.5–35.7)
CBC WITH DIFFERENTIAL/PLATELET: MCV: 96 FL (ref 79–97)
CBC WITH DIFFERENTIAL/PLATELET: MONOCYTES(ABSOLUTE): 0.5 X10E3/UL (ref 0.1–0.9)
CBC WITH DIFFERENTIAL/PLATELET: MONOCYTES: 8 %
CBC WITH DIFFERENTIAL/PLATELET: NEUTROPHILS (ABSOLUTE): 2.9 X10E3/UL (ref 1.4–7)
CBC WITH DIFFERENTIAL/PLATELET: NEUTROPHILS: 45 %
CBC WITH DIFFERENTIAL/PLATELET: NRBC: (no result)
CBC WITH DIFFERENTIAL/PLATELET: PLATELETS: 117 X10E3/UL — LOW (ref 150–450)
CBC WITH DIFFERENTIAL/PLATELET: RBC: 4.36 X10E6/UL (ref 3.77–5.28)
CBC WITH DIFFERENTIAL/PLATELET: RDW: 13 % (ref 11.7–15.4)
CBC WITH DIFFERENTIAL/PLATELET: WBC: 6.4 X10E3/UL (ref 3.4–10.8)
COMP. METABOLIC PANEL (14): A/G RATIO: 2 (ref 1.2–2.2)
COMP. METABOLIC PANEL (14): ALBUMIN: 4.1 G/DL (ref 3.9–4.9)
COMP. METABOLIC PANEL (14): ALKALINE PHOSPHATASE: 145 IU/L — HIGH (ref 44–121)
COMP. METABOLIC PANEL (14): ALT (SGPT): 33 IU/L — HIGH (ref 0–32)
COMP. METABOLIC PANEL (14): AST (SGOT): 41 IU/L — HIGH (ref 0–40)
COMP. METABOLIC PANEL (14): BILIRUBIN, TOTAL: 0.4 MG/DL (ref 0–1.2)
COMP. METABOLIC PANEL (14): BUN/CREATININE RATIO: 17 (ref 12–28)
COMP. METABOLIC PANEL (14): BUN: 10 MG/DL (ref 8–27)
COMP. METABOLIC PANEL (14): CALCIUM: 9.2 MG/DL (ref 8.7–10.3)
COMP. METABOLIC PANEL (14): CARBON DIOXIDE, TOTAL: 19 MMOL/L — LOW (ref 20–29)
COMP. METABOLIC PANEL (14): CHLORIDE: 104 MMOL/L (ref 96–106)
COMP. METABOLIC PANEL (14): CREATININE: 0.59 MG/DL (ref 0.57–1)
COMP. METABOLIC PANEL (14): EGFR: 97 ML/MIN/1.73 (ref 59–?)
COMP. METABOLIC PANEL (14): GLOBULIN, TOTAL: 2.1 G/DL (ref 1.5–4.5)
COMP. METABOLIC PANEL (14): GLUCOSE: 77 MG/DL (ref 70–99)
COMP. METABOLIC PANEL (14): POTASSIUM: 3.9 MMOL/L (ref 3.5–5.2)
COMP. METABOLIC PANEL (14): PROTEIN, TOTAL: 6.2 G/DL (ref 6–8.5)
COMP. METABOLIC PANEL (14): SODIUM: 141 MMOL/L (ref 134–144)
VITAMIN B12 AND FOLATE: FOLATE (FOLIC ACID), SERUM: >20 NG/ML
VITAMIN B12 AND FOLATE: VITAMIN B12: 1407 PG/ML — HIGH (ref 232–1245)
VITAMIN D, 25-HYDROXY: 49.8 NG/ML (ref 30–100)

## 2023-10-05 ENCOUNTER — APPOINTMENT (OUTPATIENT)
Dept: URBAN - METROPOLITAN AREA SURGERY 12 | Age: 68
Setting detail: DERMATOLOGY
End: 2023-10-05

## 2023-10-05 DIAGNOSIS — L81.4 OTHER MELANIN HYPERPIGMENTATION: ICD-10-CM

## 2023-10-05 DIAGNOSIS — L57.0 ACTINIC KERATOSIS: ICD-10-CM

## 2023-10-05 DIAGNOSIS — D22 MELANOCYTIC NEVI: ICD-10-CM

## 2023-10-05 DIAGNOSIS — D18.0 HEMANGIOMA: ICD-10-CM

## 2023-10-05 DIAGNOSIS — Z85.828 PERSONAL HISTORY OF OTHER MALIGNANT NEOPLASM OF SKIN: ICD-10-CM

## 2023-10-05 DIAGNOSIS — L82.1 OTHER SEBORRHEIC KERATOSIS: ICD-10-CM

## 2023-10-05 DIAGNOSIS — L57.8 OTHER SKIN CHANGES DUE TO CHRONIC EXPOSURE TO NONIONIZING RADIATION: ICD-10-CM

## 2023-10-05 PROBLEM — D22.5 MELANOCYTIC NEVI OF TRUNK: Status: ACTIVE | Noted: 2023-10-05

## 2023-10-05 PROBLEM — D18.01 HEMANGIOMA OF SKIN AND SUBCUTANEOUS TISSUE: Status: ACTIVE | Noted: 2023-10-05

## 2023-10-05 PROCEDURE — OTHER REASSURANCE: OTHER

## 2023-10-05 PROCEDURE — OTHER COUNSELING: OTHER

## 2023-10-05 PROCEDURE — OTHER MIPS QUALITY: OTHER

## 2023-10-05 PROCEDURE — 17000 DESTRUCT PREMALG LESION: CPT

## 2023-10-05 PROCEDURE — OTHER LIQUID NITROGEN: OTHER

## 2023-10-05 PROCEDURE — 99213 OFFICE O/P EST LOW 20 MIN: CPT | Mod: 25

## 2023-10-05 PROCEDURE — OTHER SUNSCREEN RECOMMENDATIONS: OTHER

## 2023-10-05 ASSESSMENT — LOCATION DETAILED DESCRIPTION DERM
LOCATION DETAILED: RIGHT SUPERIOR UPPER BACK
LOCATION DETAILED: LEFT MID-UPPER BACK
LOCATION DETAILED: LEFT SUPERIOR UPPER BACK
LOCATION DETAILED: NASAL ROOT

## 2023-10-05 ASSESSMENT — LOCATION SIMPLE DESCRIPTION DERM
LOCATION SIMPLE: LEFT UPPER BACK
LOCATION SIMPLE: NOSE
LOCATION SIMPLE: RIGHT UPPER BACK

## 2023-10-05 ASSESSMENT — LOCATION ZONE DERM
LOCATION ZONE: NOSE
LOCATION ZONE: TRUNK

## 2023-10-05 NOTE — PROCEDURE: LIQUID NITROGEN
Consent: The patient's consent was obtained including but not limited to risks of crusting, scabbing, blistering, scarring, darker or lighter pigmentary change, recurrence, incomplete removal and infection.
Show Aperture Variable?: Yes
Duration Of Freeze Thaw-Cycle (Seconds): 5
Render Note In Bullet Format When Appropriate: No
Detail Level: Detailed
Number Of Freeze-Thaw Cycles: 1 freeze-thaw cycle
Post-Care Instructions: I reviewed with the patient in detail post-care instructions. Patient is to wear sunprotection, and avoid picking at any of the treated lesions. Pt may apply Vaseline to crusted or scabbing areas.

## 2023-10-05 NOTE — PROCEDURE: MIPS QUALITY
Detail Level: Detailed
Quality 110: Preventive Care And Screening: Influenza Immunization: Influenza immunization was not ordered or administered, reason not given
Quality 47: Advance Care Plan: Advance Care Planning discussed and documented; advance care plan or surrogate decision maker documented in the medical record.
Quality 111:Pneumonia Vaccination Status For Older Adults: Patient received any pneumococcal conjugate or polysaccharide vaccine on or after their 60th birthday and before the end of the measurement period

## 2023-10-13 ENCOUNTER — AMBULATORY SURGICAL CENTER (OUTPATIENT)
Dept: URBAN - METROPOLITAN AREA SURGERY 19 | Facility: SURGERY | Age: 68
End: 2023-10-13
Payer: COMMERCIAL

## 2023-10-13 ENCOUNTER — OFFICE (OUTPATIENT)
Dept: URBAN - METROPOLITAN AREA PATHOLOGY 10 | Facility: PATHOLOGY | Age: 68
End: 2023-10-13
Payer: COMMERCIAL

## 2023-10-13 DIAGNOSIS — R13.10 DYSPHAGIA, UNSPECIFIED: ICD-10-CM

## 2023-10-13 DIAGNOSIS — K31.9 DISEASE OF STOMACH AND DUODENUM, UNSPECIFIED: ICD-10-CM

## 2023-10-13 DIAGNOSIS — K22.9 DISEASE OF ESOPHAGUS, UNSPECIFIED: ICD-10-CM

## 2023-10-13 LAB
RELEVANT H&P ENDOSCOPY: (no result)
RELEVANT H&P ENDOSCOPY: (no result)

## 2023-10-13 PROCEDURE — 43239 EGD BIOPSY SINGLE/MULTIPLE: CPT | Performed by: INTERNAL MEDICINE

## 2023-10-13 PROCEDURE — 88305 TISSUE EXAM BY PATHOLOGIST: CPT | Mod: TC | Performed by: STUDENT IN AN ORGANIZED HEALTH CARE EDUCATION/TRAINING PROGRAM

## 2023-11-15 ENCOUNTER — OFFICE (OUTPATIENT)
Dept: URBAN - METROPOLITAN AREA CLINIC 84 | Facility: CLINIC | Age: 68
End: 2023-11-15
Payer: COMMERCIAL

## 2023-11-15 VITALS
SYSTOLIC BLOOD PRESSURE: 140 MMHG | HEART RATE: 98 BPM | DIASTOLIC BLOOD PRESSURE: 80 MMHG | HEIGHT: 59 IN | WEIGHT: 116 LBS

## 2023-11-15 DIAGNOSIS — Z86.19 PERSONAL HISTORY OF OTHER INFECTIOUS AND PARASITIC DISEASES: ICD-10-CM

## 2023-11-15 DIAGNOSIS — K50.818 CROHN'S DISEASE OF BOTH SMALL AND LARGE INTESTINE WITH OTHER: ICD-10-CM

## 2023-11-15 DIAGNOSIS — K56.609 UNSPECIFIED INTESTINAL OBSTRUCTION, UNSPECIFIED AS TO PARTIA: ICD-10-CM

## 2023-11-15 PROCEDURE — 99214 OFFICE O/P EST MOD 30 MIN: CPT | Performed by: NURSE PRACTITIONER

## 2023-11-15 RX ORDER — BUDESONIDE 3 MG/1
CAPSULE ORAL
Qty: 42 | Refills: 0 | Status: ACTIVE
Start: 2023-11-15

## 2024-01-16 ENCOUNTER — OFFICE (OUTPATIENT)
Dept: URBAN - METROPOLITAN AREA CLINIC 72 | Facility: CLINIC | Age: 69
End: 2024-01-16
Payer: COMMERCIAL

## 2024-01-16 VITALS — WEIGHT: 114 LBS | HEIGHT: 59 IN

## 2024-01-16 DIAGNOSIS — D69.6 THROMBOCYTOPENIA, UNSPECIFIED: ICD-10-CM

## 2024-01-16 DIAGNOSIS — Z86.19 PERSONAL HISTORY OF OTHER INFECTIOUS AND PARASITIC DISEASES: ICD-10-CM

## 2024-01-16 DIAGNOSIS — Z92.29 PERSONAL HISTORY OF OTHER DRUG THERAPY: ICD-10-CM

## 2024-01-16 DIAGNOSIS — R74.01 ELEVATION OF LEVELS OF LIVER TRANSAMINASE LEVELS: ICD-10-CM

## 2024-01-16 DIAGNOSIS — Z85.89 PERSONAL HISTORY OF MALIGNANT NEOPLASM OF OTHER ORGANS AND S: ICD-10-CM

## 2024-01-16 DIAGNOSIS — K50.019 CROHN'S DISEASE OF SMALL INTESTINE WITH UNSPECIFIED COMPLICA: ICD-10-CM

## 2024-01-16 DIAGNOSIS — Z90.49 ACQUIRED ABSENCE OF OTHER SPECIFIED PARTS OF DIGESTIVE TRACT: ICD-10-CM

## 2024-01-16 PROCEDURE — 99215 OFFICE O/P EST HI 40 MIN: CPT | Mod: 95 | Performed by: INTERNAL MEDICINE

## 2024-01-16 RX ORDER — RISANKIZUMAB-RZAA 180 MG/1.2
KIT SUBCUTANEOUS
Qty: 1 | Refills: 5 | Status: COMPLETED
Start: 2024-01-16 | End: 2024-06-11

## 2024-01-16 NOTE — SERVICEHPINOTES
is seen today for a follow-up visit. 
amada
br68 year old female with small crohn's disease since her 20's, remote smoking (none since 1977) status post ileocacal resection and SB surgery with about 68 cm of SB removed. she also has [id number] was seen by Dr. Gutierrez in 1/2017 per that visitbr"She reports that she was diagnosed with Crohn's ileocolitis at age 19 and in reviewing some of the records that she brings with her today, she has had multiple surgeries with ~68cm of resected small bowel. She reports that her last surgery was a little over 10yrs ago.Lanette most recent colonoscope was done in 6/2015 (while living in [place]) and was remarkable for an ileo-colonic anastomosis in the ascending colon which was patent - however, the anastomosis and her caleb-TI contained a few patchy non-bleeding erosions.brPer her report, she has only been on 5-ASA medications - previously Pentasa but currently Sulfasalazine. On this regimen, she has been having 1-3 formed BMs/day without any GI tract bleeding symptoms or abdominal pain/cramping. She reports that her weight has been stable and her appetite has been good. She also reports that she has been diagnosed with osteoporosis and has been on Prolia but could not tolerate it - she is going to have a follow-up DEXA scan through 's office in April."She did not follow up on Dr. Gutierrez's recommendation for work up and treatment.She has been seeing Dr. Meneses in [facility]. Her last surgery was 1994. She flared around 2012. She has tried to stay off biologics. She had another flare in June and was on prednisone since June to Aug. When she flares she gets bad abdominal pain and obstructive symptoms. Her last colonoscopy was at least 4-5 years ago.She is currently on pentasa 6 pills a day. She is also on medicine for GERD (prevacid and pepcid) and has had a fundoplicationCurrently she has a BM 2-3 BM in the AM soft to water. No blood. Only mild pain after eating.Interval History:11/18/2021She has been flaring, having some RLQ pain and diarrhea. Sometimes she gets it into the back and into the perineum. She is watching her diet.brShe hasn't seen any blood in th estool.Riana is taking pentasa 6 pills a day. when she tried to cut down dose she felt like she got worse.brInterval History:3/3/2022bAndrae received initial dose of entyvio 12/23brShe has had 3 doses. she is now on the 8 week maintenance.Riana is starting to feel a little better.Riana is getting it done at [facility] in  which is working well for us she doesn't pay anything.Riana is off budesonide.Riana is still taking pentasa 6 pills ad ay.Riana is still having some right pelvic pain that she feels like is improving.Riana is still having loose BM, diarrhea is not as often. It was initially 4-5 times a day and now twice a dayInterval History:5/23/2022brvit D and b12 were normalbrFC was 130brI asked her to remain on pentasa and start xifaxanbrHer last infusion was about 8 weeks ago. She felt like that helped her back and perineal pain is better.Riana has had 4 URI infections on entyvio but she has also had lots of exposures [facility] and kids are in .Riana was seen in urgent care and all covid, strep, flu test were negative. She is also having some diarrhea and pain.Riana did not notice any difference with the xifaxan. she is taking pentasa 6 pills a day. In the last couple days she has had up to 10 BM a day. No blood in the stool.Interval History:7/28/2022br5/22 C diff negbr5/22 CTE with active disease in the neoTIbr7/22 colonoscopy with ileocolonic anastomosis and multiple erosions/ulcers on the ileal side and at anastomoses. THe colonic mucosa was normalbrLast entyvio infusion was 5/22brHer pain has been a little better. She is still having diarrhea. 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 She goes once a year for checks and have things removed from time to time.Riana is taking the pentasa 6 pills a day.Interval History:11/28/2022brIn September she had the infusions and she felt better right away.Riana finished the entocort. She is still on 6 pills a day of pentasa.br11/9 she had her first injection. She was a week late and was starting to have a little diarrhea.Riana felt that it did help.brStarting less then a week ago she had some aching in her rectum. She has had a little diarrhea as well.Riana tried some Aquaphor a little.Riana is not noticing URI symptoms on stelara (was having a lot of that on entyvio)Interval History:4/3/2023brMRE and CT with disease activity in the colon and small bowelbrI started budesonide mid januarybrcolonoscopy with disease at the neoTI anastomosis but non once I entered caleb TI. there was a possible stricture.brFC was 230.Riana had some dysphagia and tightness at GE junction was dilated to 18.brShe went down to 4 pills a day of pentasa.Riana is on 2 budesonidebrShe decreased coffee and decreased winebrShe had the IV and 2 injections of the stelaraShe went to the ER 12 days after taking the 2nd stelara injection. Because the imaging saw lots of inflammation, she therefor stopped the stellara.In terms of the gastritis/GERD she is on lansoprazole 30 and famotidine 40. She has tried nexium, protonix, prilosec and they didn't help. 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 She really didn't have diarrhea till they started her on antibiotics.brThe diarrhea got better when they stopped the antibiotics. NOw she is having diarrhea but it is not as bad and controlled with 2 Imodium a day.Riana is due to follow up with Dr. Lou in 3 weeks. She will remain on ganciclovir during that time.Riana is slowly getting her energy back. She is having a little discomfort with taking a deep breath but that is improving with exercise and flonaseInterval History:8/8/2023brI personally spoke to Dr. Lou and she is concerned about skirizi treatment but unclear if anti-TNF would be safer and other meds have not been effective. will plan for prophylaxis.Riana is feeling a little tiredbrShe has a squamous cell lesion on the left vulva that is being removed next month. She is seeing Dr. Bruno gonzales.brBowel movements are a little loose, sometimes liquid. She felt like her crohns pain was coming back and then she got skirizi and things are feeling better. She is taking immodiumbrHer acid reflux has been bothering her. She has epigastric pain and belching. She feels like the fundoplication is not working as before.Riana is taking lasoprazole 30 mg twice a day,Riana is going to do a virtual visit on 8/29 and then Dr. Rushing on 8/30 at [facility].Riana is on valgancyclovir 2 pill a day and seems to be doing OK with that.brInterval History:9/15/2023brI reviewed note from [facility] and they felt that a severe R2 recurrene would be needed to change off skirizi. ALso suggested that her disease was perhaps not enough to change off entyvio (symptomatically she was not doing well though).Manoj Monreal did not feel that she needed the valcyte for prophyalxis and she spoke to Dr. Lou who agreed.Per [facility], no change unless severe raking ulcers seen with >R2 reoccurence, FC goal &lt250, get colonoscopy 6 months after restarting skirizi to eval stricture and get CTE MRE If there is a question to eval for upstream dilation/stricSheffie has had 2 induction infusions and is due for final on 9/28 Would be due for colonosocpy 2/2024The RLQ pain and right flank pain is much better.brLower abdominal cramping with diarrhea is improved.Adin has some upper epigastric pain that is there most days but is better. It is sometimes worse after eating. It is a aching pain.Riana has some diarrhea but not as much and she had a normal BM this AM. No blood in the stool. No fevers, she still feels fatigued.Riana also has insomnia. She has not really had that before.Riana has been on lansoprazole for 20 years. She was having acid reflux.Riana is having some intermittent dysphagia with solids and pills.Riana takes the sucralfate only once in a while.Interval Hx 11/15/23:Riana was admitted to [facility] 11/3/23 through 11/8/23 with a small bowel obstruction. She's a history of Crohn's disease (on Skyrizi), history of multiple small bowel resection and GERD. GI and colorectal surgery were consulted and patient's symptoms were managed with supportive care and started on budesonide with improvement. CMV PCR negative. B12, vitamin D and iron studies normal. TB quant Gold, urine histoplasmosis in process. Electrolytes normal. She improved and was discharged 11/18/23 to follow up with GI. Today, she reports feeling better. She is on budesonide 9 mg daily need to taper. She seeing [facility] surgery Dr. Palacios January 12. She's been doing a low residue/purée-type diet. She's taking Valganciclovir BID and is wondering if she can stop or if she should continue while on the budesonide. 11/4/23-CT findings suspicious for at least partial small bowel obstruction, with transition an area of terminal ileal wall thickening and luminal narrowing, suspicious for a stricture. Mild gastric antral wall thickening and mucosal hyper enhancement. Nondistended gallbladder. Small to moderate hiatal hernia.br11/4/23 x-ray abdomen-dilated loops of small bowel up to 3.3 cm, with nonvisualizationof numerous loops of fluid-filled and dilated small bowel. Findings concerning for possible partial obstruction vs 
br
br Plan from last visit:
brPt is following with Dr. Palacios Jan 12th. discussed low residue diet and warning s/sx. She can continue valganciclovir BID while on budesonide. I will send in the rest of the budesonide taper. Follow up with Dr. Negron 1/16/24 br
br Interval History: 1/16/2024br
br 
She had colonoscopy with stricture dilated and R2 disease
brthey recommended increase skirizi to every 4 weeks or change to rinvoq. br She is feeling better after the dilation. 
br Things are moving through better.  She is careful with her diet. 
br After she weaned off the budesonide in mid to late december she started to flare and now on Budesonide 3 mg once a day. 
brPlan is to try skirizi every 4 weeks but hasn't heard from insurance. 
br No fevers, this is wearing her down.  
br She sees the surgeon this Friday. 
br she is not currently taking valcyte
br she is on zinc, vit D, calcium, magnesiu, b12, mvi, vitamin C
brShe is having a BM daily, regular, no significant diarrhea on the budesonide.  The pain is improved on budesonide as well. amadaHer last skirizi was 12/21br
br br
br My nurse has reviewed and updated the medication list with the patient (medication reconciliation). I have also reviewed the medication list. New updates were made to the patient's medical, social and family history. Pertinent details are also noted above in the HPI.

## 2024-01-16 NOTE — SERVICENOTES
Telehealth Platform Used:  doximity
Location of patient: home
Location of provider: [place]
Other persons participating: maury

## 2024-03-18 ENCOUNTER — OFFICE (OUTPATIENT)
Dept: URBAN - METROPOLITAN AREA CLINIC 72 | Facility: CLINIC | Age: 69
End: 2024-03-18
Payer: COMMERCIAL

## 2024-03-18 VITALS
OXYGEN SATURATION: 98 % | HEIGHT: 59 IN | DIASTOLIC BLOOD PRESSURE: 76 MMHG | WEIGHT: 110 LBS | HEART RATE: 73 BPM | SYSTOLIC BLOOD PRESSURE: 120 MMHG

## 2024-03-18 DIAGNOSIS — Z85.828 PERSONAL HISTORY OF OTHER MALIGNANT NEOPLASM OF SKIN: ICD-10-CM

## 2024-03-18 DIAGNOSIS — R74.01 ELEVATION OF LEVELS OF LIVER TRANSAMINASE LEVELS: ICD-10-CM

## 2024-03-18 DIAGNOSIS — Z90.49 ACQUIRED ABSENCE OF OTHER SPECIFIED PARTS OF DIGESTIVE TRACT: ICD-10-CM

## 2024-03-18 DIAGNOSIS — K50.818 CROHN'S DISEASE OF BOTH SMALL AND LARGE INTESTINE WITH OTHER: ICD-10-CM

## 2024-03-18 PROCEDURE — 99214 OFFICE O/P EST MOD 30 MIN: CPT | Performed by: INTERNAL MEDICINE

## 2024-03-18 RX ORDER — SUCRALFATE 1 G/1
TABLET ORAL
Qty: 90 | Refills: 6 | Status: ACTIVE
Start: 2023-08-08

## 2024-03-18 NOTE — SERVICENOTES
Our goal is to partner with you to improve your health and well being. It is important for you to complete necessary testing and follow the instructions given to you at your clinic visit. Our office will call you within 2 weeks with results of any testing but you may also call sooner to obtain results [phone].   If you have any questions or concerns please feel free to call.  We take your care very seriously and we thank you for your trust!
- labs in early May (CBC, CMP, b12, folate, iron profile, ferritin, vitamin D, INR)
- sucralfate can be used up to 4 times a day
- please ask IBD group to send me notes, operative report and path.  We are trying to get them as well. 
- start a daily multivitamin if you are not already taking one, continue other supplements for now 
- You can take a probiotic for 1-2 months. I like natures bounty 10
- follow up in 3 months or sooner if needed.

## 2024-03-18 NOTE — SERVICEHPINOTES
Denisa Daily   is seen today for a follow-up visit.  
br
br68 year old female with small crohn's disease since her 20's, remote smoking (none since 1977) status post ileocecal resection and SB surgery with about 68 cm of SB removed. she also has osteoporosisShe was seen by Dr. Gutierrez in 1/2017 per that visitbr"She reports that she was diagnosed with Crohn's ileocolitis at age 19 and in reviewing some of the records that she brings with her today, she has had multiple surgeries with ~68cm of resected small bowel. She reports that her last surgery was a little over 10yrs ago.Lanette most recent colonoscope was done in 6/2015 (while living in [place]) and was remarkable for an ileo-colonic anastomosis in the ascending colon which was patent - however, the anastomosis and her caleb-TI contained a few patchy non-bleeding erosions.brPer her report, she has only been on 5-ASA medications - previously Pentasa but currently Sulfasalazine. On this regimen, she has been having 1-3 formed BMs/day without any GI tract bleeding symptoms or abdominal pain/cramping. She reports that her weight has been stable and her appetite has been good. She also reports that she has been diagnosed with osteoporosis and has been on Prolia but could not tolerate it - she is going to have a follow-up DEXA scan through 's office in April."She did not follow up on Dr. Gutierrez's recommendation for work up and treatment.She has been seeing Dr. Meneses in [facility]. Her last surgery was 1994. She flared around 2012. She has tried to stay off biologics. She had another flare in June and was on prednisone since June to Aug. When she flares she gets bad abdominal pain and obstructive symptoms. Her last colonoscopy was at least 4-5 years ago.She is currently on pentasa 6 pills a day. She is also on medicine for GERD (prevacid and pepcid) and has had a fundoplicationCurrently she has a BM 2-3 BM in the AM soft to water. No blood. Only mild pain after eating.Interval History:11/18/2021She has been flaring, having some RLQ pain and diarrhea. Sometimes she gets it into the back and into the perineum. She is watching her diet.brShe hasn't seen any blood in the stool.Riana is taking pentasa 6 pills a day. when she tried to cut down dose she felt like she got worse.brInterval History:3/3/2022bAndrae received initial dose of entyvio 12/23brShe has had 3 doses. she is now on the 8 week maintenance.Riana is starting to feel a little better.Riana is off budesonide.Riana is still taking pentasa 6 pills ad ay.Riana is still having some right pelvic pain that she feels like is improving.Riana is still having loose BM, diarrhea is not as often. It was initially 4-5 times a day and now twice a dayInterval History:5/23/2022brvit D and b12 were normalbrFC was 130brI asked her to remain on pentasa and start xifaxanbrHer last infusion was about 8 weeks ago. She felt like that helped her back and perineal pain is better.Riana has had 4 URI infections on entyvio but she has also had lots of exposures [facility] and kids are in .Riana was seen in urgent care and all covid, strep, flu test were negative. She is also having some diarrhea and pain.Riana did not notice any difference with the xifaxan. she is taking pentasa 6 pills a day. In the last couple days she has had up to 10 BM a day. No blood in the stool.Interval History:7/28/2022br5/22 C diff negbr5/22 CTE with active disease in the neoTIbr7/22 colonoscopy with ileocolonic anastomosis and multiple erosions/ulcers on the ileal side and at anastomoses. THe colonic mucosa was normalbrLast entyvio infusion was 5/22brHer pain has been a little better. She is still having diarrhea. 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 She goes once a year for checks and have things removed from time to time.Riana is taking the pentasa 6 pills a day.Interval History:11/28/2022brIn September she had the infusions and she felt better right away.Riana finished the entocort. She is still on 6 pills a day of pentasa.br11/9 she had her first injection. She was a week late and was starting to have a little diarrhea.Riana felt that it did help.brStarting less then a week ago she had some aching in her rectum. She has had a little diarrhea as well.Riana tried some Aquaphor a little.Riana is not noticing URI symptoms on stelara (was having a lot of that on entyvio)Interval History:4/3/2023brMRE and CT with disease activity in the colon and small bowelbrI started budesonide mid januarybrcolonoscopy with disease at the neoTI anastomosis but non once I entered caleb TI. there was a possible stricture.brFC was 230.Riana had some dysphagia and tightness at GE junction was dilated to 18.Riana went down to 4 pills a day of pentasa.Riana is on 2 budesonidebrShe decreased coffee and decreased winebrShe had the IV and 2 injections of the stelaraShe went to the ER 12 days after taking the 2nd stelara injection. Because the imaging saw lots of inflammation, she therefor stopped the stellara.In terms of the gastritis/GERD she is on lansoprazole 30 and famotidine 40. She has tried nexium, protonix, prilosec and they didn't help. 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 She really didn't have diarrhea till they started her on antibiotics.brThe diarrhea got better when they stopped the antibiotics. NOw she is having diarrhea but it is not as bad and controlled with 2 Imodium a day.Riana is due to follow up with Dr. Lou in 3 weeks. She will remain on ganciclovir during that time.Riana is slowly getting her energy back. She is having a little discomfort with taking a deep breath but that is improving with exercise and flonaseInterval History:8/8/2023brI personally spoke to Dr. Lou and she is concerned about skirizi treatment but unclear if anti-TNF would be safer and other meds have not been effective. will plan for prophylaxis.Riana is feeling a little tiredbrShe has a squamous cell lesion on the left vulva that is being removed next month. She is seeing Dr. Bruno gonzales.brBowel movements are a little loose, sometimes liquid. She felt like her crohns pain was coming back and then she got skirizi and things are feeling better. She is taking immodiumbrHer acid reflux has been bothering her. She has epigastric pain and belching. She feels like the fundoplication is not working as before.Riana is taking lasoprazole 30 mg twice a day,Riana is going to do a virtual visit on 8/29 and then Dr. Rushing on 8/30 at [facility].Riana is on valganciclovir 2 pill a day and seems to be doing OK with that.brInterval History:9/15/2023brI reviewed note from [facility] and they felt that a severe R2 recurrence would be needed to change off skirizi. ALso suggested that her disease was perhaps not enough to change off entyvio (symptomatically she was not doing well though).Manoj Monreal did not feel that she needed the valcyte for prophylaxes and she spoke to Dr. Lou who agreed.Per [facility], no change unless severe raking ulcers seen with >R2 reoccurence, FC goal &lt250, get colonoscopy 6 months after restarting skirizi to eval stricture and get CTE MRE If there is a question to eval for upstream dilation/Antony has had 2 induction infusions and is due for final on 9/28 Would be due for colonosocpy 2/2024The RLQ pain and right flank pain is much better.brLower abdominal cramping with diarrhea is improved.Adin has some upper epigastric pain that is there most days but is better. It is sometimes worse after eating. It is a aching pain.Riana has some diarrhea but not as much and she had a normal BM this AM. No blood in the stool. No fevers, she still feels fatigued.Riana also has insomnia. She has not really had that before.Riana has been on lansoprazole for 20 years. She was having acid reflux.Riana is having some intermittent dysphagia with solids and pills.Riana takes the sucralfate only once in a while.Interval Hx 11/15/23:Riana was admitted to [facility] 11/3/23 through 11/8/23 with a small bowel obstruction. She's a history of Crohn's disease (on Skyrizi), history of multiple small bowel resection and GERD. GI and colorectal surgery were consulted and patient's symptoms were managed with supportive care and started on budesonide with improvement. CMV PCR negative. B12, vitamin D and iron studies normal. TB quant Gold, urine histoplasmosis in process. Electrolytes normal. She improved and was discharged 11/18/23 to follow up with GI. Today, she reports feeling better. She is on budesonide 9 mg daily need to taper. She seeing [facility] surgery Dr. Palacios January 12. She's been doing a low residue/purée-type diet. She's taking Valganciclovir BID and is wondering if she can stop or if she should continue while on the budesonide.11/4/23-CT findings suspicious for at least partial small bowel obstruction, with transition an area of terminal ileal wall thickening and luminal narrowing, suspicious for a stricture. Mild gastric antral wall thickening and mucosal hyper enhancement. Nondistended gallbladder. Small to moderate hiatal hernia.br11/4/23 x-ray abdomen-dilated loops of small bowel up to 3.3 cm, with nonvisualizationof numerous loops of fluid-filled and dilated small bowel. Findings concerning for possible partial obstruction vsbrInterval History: 1/16/2024Sheffie had colonoscopy with stricture dilated and R2 diseasebrthey recommended increase skirizi to every 4 weeks or change to rinvoq. Riana is feeling better after the dilation. brThings are moving through better. She is careful with her diet. brAfter she weaned off the budesonide in mid to late december she started to flare and now on Budesonide 3 mg once a day. brPlan is to try skirizi every 4 weeks but hasn't heard from insurance. brNo fevers, this is wearing her down. Riana sees the surgeon this Friday. riana is not currently taking valcytebrshe is on zinc, vit D, calcium, magnesiu, b12, mvi, vitamin CbrShe is having a BM daily, regular, no significant diarrhea on the budesonide. The pain is improved on budesonide as well. Lanette last skirizi was 12/21    br  Plan from last visit:
br change skirizi to every 4 weeksInterval History:  3/18/2024   
br   She saw Dr. Palacios and decided on surgery and she had it on 3/6. He removed amost 2 feet of small bowel. br 
He also did a liver biopsy because he said it looked disease. br Her acid reflux flared up a lot after the surgery. There is heartburn and epigastric pain.  Still some soreness from surgery. 
br Her appetite is fair and she is still losing some weight but she is eating more 
br SHe also notes some diarrhea last week but spoke to Dr. Minaya' office and they recommended more fiber so she has started that.  She was also wanting my opinion on probiotics for gut health. br amada My nurse has reviewed and updated the medication list with the patient (medication reconciliation). I have also reviewed the medication list. New updates were made to the patient's medical, social and family history. Pertinent details are also noted above in the HPI.br visited="true"

## 2024-04-11 ENCOUNTER — APPOINTMENT (OUTPATIENT)
Dept: URBAN - METROPOLITAN AREA SURGERY 12 | Age: 69
Setting detail: DERMATOLOGY
End: 2024-04-11

## 2024-04-11 DIAGNOSIS — L81.4 OTHER MELANIN HYPERPIGMENTATION: ICD-10-CM

## 2024-04-11 DIAGNOSIS — L82.1 OTHER SEBORRHEIC KERATOSIS: ICD-10-CM

## 2024-04-11 DIAGNOSIS — D18.0 HEMANGIOMA: ICD-10-CM

## 2024-04-11 DIAGNOSIS — L57.8 OTHER SKIN CHANGES DUE TO CHRONIC EXPOSURE TO NONIONIZING RADIATION: ICD-10-CM

## 2024-04-11 DIAGNOSIS — L71.8 OTHER ROSACEA: ICD-10-CM

## 2024-04-11 DIAGNOSIS — D22 MELANOCYTIC NEVI: ICD-10-CM

## 2024-04-11 DIAGNOSIS — Z85.828 PERSONAL HISTORY OF OTHER MALIGNANT NEOPLASM OF SKIN: ICD-10-CM

## 2024-04-11 PROBLEM — D22.5 MELANOCYTIC NEVI OF TRUNK: Status: ACTIVE | Noted: 2024-04-11

## 2024-04-11 PROBLEM — D48.5 NEOPLASM OF UNCERTAIN BEHAVIOR OF SKIN: Status: ACTIVE | Noted: 2024-04-11

## 2024-04-11 PROBLEM — D18.01 HEMANGIOMA OF SKIN AND SUBCUTANEOUS TISSUE: Status: ACTIVE | Noted: 2024-04-11

## 2024-04-11 PROCEDURE — 11103 TANGNTL BX SKIN EA SEP/ADDL: CPT

## 2024-04-11 PROCEDURE — OTHER BIOPSY BY SHAVE METHOD: OTHER

## 2024-04-11 PROCEDURE — OTHER REASSURANCE: OTHER

## 2024-04-11 PROCEDURE — OTHER COUNSELING: OTHER

## 2024-04-11 PROCEDURE — OTHER PRESCRIPTION: OTHER

## 2024-04-11 PROCEDURE — OTHER MIPS QUALITY: OTHER

## 2024-04-11 PROCEDURE — OTHER PRESCRIPTION MEDICATION MANAGEMENT: OTHER

## 2024-04-11 PROCEDURE — 99214 OFFICE O/P EST MOD 30 MIN: CPT | Mod: 25

## 2024-04-11 PROCEDURE — OTHER SUNSCREEN RECOMMENDATIONS: OTHER

## 2024-04-11 PROCEDURE — 11102 TANGNTL BX SKIN SINGLE LES: CPT

## 2024-04-11 RX ORDER — METRONIDAZOLE 7.5 MG/G
CREAM TOPICAL
Qty: 45 | Refills: 3 | Status: ERX | COMMUNITY
Start: 2024-04-11

## 2024-04-11 ASSESSMENT — LOCATION DETAILED DESCRIPTION DERM
LOCATION DETAILED: RIGHT SUPERIOR UPPER BACK
LOCATION DETAILED: LEFT INFERIOR MEDIAL MALAR CHEEK
LOCATION DETAILED: LEFT MID-UPPER BACK
LOCATION DETAILED: LEFT SUPERIOR UPPER BACK
LOCATION DETAILED: SUPERIOR MID FOREHEAD

## 2024-04-11 ASSESSMENT — LOCATION SIMPLE DESCRIPTION DERM
LOCATION SIMPLE: RIGHT UPPER BACK
LOCATION SIMPLE: LEFT UPPER BACK
LOCATION SIMPLE: LEFT CHEEK
LOCATION SIMPLE: SUPERIOR FOREHEAD

## 2024-04-11 ASSESSMENT — LOCATION ZONE DERM
LOCATION ZONE: FACE
LOCATION ZONE: TRUNK

## 2024-04-11 NOTE — PROCEDURE: BIOPSY BY SHAVE METHOD
Body Location Override (Optional - Billing Will Still Be Based On Selected Body Map Location If Applicable): superior mid scalp
Detail Level: Detailed
Depth Of Biopsy: dermis
Was A Bandage Applied: Yes
Size Of Lesion In Cm: 0
Biopsy Type: H and E
Biopsy Method: Dermablade
Anesthesia Type: 1% lidocaine with epinephrine
Anesthesia Volume In Cc: 0.5
Hemostasis: Drysol
Wound Care: Petrolatum
Dressing: bandage
Destruction After The Procedure: No
Type Of Destruction Used: Curettage
Curettage Text: The wound bed was treated with curettage after the biopsy was performed.
Cryotherapy Text: The wound bed was treated with cryotherapy after the biopsy was performed.
Electrodesiccation Text: The wound bed was treated with electrodesiccation after the biopsy was performed.
Electrodesiccation And Curettage Text: The wound bed was treated with electrodesiccation and curettage after the biopsy was performed.
Silver Nitrate Text: The wound bed was treated with silver nitrate after the biopsy was performed.
Lab: [id number]
Lab Facility: [id number]
Consent: Written consent was obtained and risks were reviewed including but not limited to scarring, infection, bleeding, scabbing, incomplete removal, nerve damage and allergy to anesthesia.
Post-Care Instructions: I reviewed with the patient in detail post-care instructions. Patient is to keep the biopsy site dry overnight, and then apply bacitracin twice daily until healed. Patient may apply hydrogen peroxide soaks to remove any crusting.
Notification Instructions: Patient will be notified of biopsy results. However, patient instructed to call the office if not contacted within 2 weeks.
Billing Type: Third-Party Bill
Information: Selecting Yes will display possible errors in your note based on the variables you have selected. This validation is only offered as a suggestion for you. PLEASE NOTE THAT THE VALIDATION TEXT WILL BE REMOVED WHEN YOU FINALIZE YOUR NOTE. IF YOU WANT TO FAX A PRELIMINARY NOTE YOU WILL NEED TO TOGGLE THIS TO 'NO' IF YOU DO NOT WANT IT IN YOUR FAXED NOTE.

## 2024-04-11 NOTE — PROCEDURE: PRESCRIPTION MEDICATION MANAGEMENT
Render In Strict Bullet Format?: No
Initiate Treatment: Metronidazole- apply to face once daily.
Detail Level: Zone

## 2024-05-09 ENCOUNTER — APPOINTMENT (OUTPATIENT)
Dept: URBAN - METROPOLITAN AREA SURGERY 12 | Age: 69
Setting detail: DERMATOLOGY
End: 2024-05-09

## 2024-05-09 PROBLEM — C44.529 SQUAMOUS CELL CARCINOMA OF SKIN OF OTHER PART OF TRUNK: Status: ACTIVE | Noted: 2024-05-09

## 2024-05-09 PROCEDURE — 12032 INTMD RPR S/A/T/EXT 2.6-7.5: CPT

## 2024-05-09 PROCEDURE — 11602 EXC TR-EXT MAL+MARG 1.1-2 CM: CPT

## 2024-05-09 PROCEDURE — OTHER EXCISION: OTHER

## 2024-05-09 NOTE — PROCEDURE: EXCISION
Dermal Closure: buried vertical mattress
Show Referring Physician Variable: Yes
Secondary Defect Width (In Cm): 0
Hemigard Intro: Due to skin fragility and wound tension, it was decided to use HEMIGARD adhesive retention suture devices to permit a linear closure. The skin was cleaned and dried for a 6cm distance away from the wound. Excessive hair, if present, was removed to allow for adhesion.
Number Of Hemigard Strips Per Side: 1
Complex Repair Preamble Text (Leave Blank If You Do Not Want): Extensive wide undermining was performed.
Suture Removal: 14 days
Island Pedicle Flap Text: The defect edges were debeveled with a #15 scalpel blade.  Given the location of the defect, shape of the defect and the proximity to free margins an island pedicle advancement flap was deemed most appropriate.  Using a sterile surgical marker, an appropriate advancement flap was drawn incorporating the defect, outlining the appropriate donor tissue and placing the expected incisions within the relaxed skin tension lines where possible.    The area thus outlined was incised deep to adipose tissue with a #15 scalpel blade.  The skin margins were undermined to an appropriate distance in all directions around the primary defect and laterally outward around the island pedicle utilizing iris scissors.  There was minimal undermining beneath the pedicle flap.
Lazy S Intermediate Repair Preamble Text (Leave Blank If You Do Not Want): Undermining was performed with blunt dissection.
Complex Repair And A-T Advancement Flap Text: The defect edges were debeveled with a #15 scalpel blade.  The primary defect was closed partially with a complex linear closure.  Given the location of the remaining defect, shape of the defect and the proximity to free margins an A-T advancement flap was deemed most appropriate for complete closure of the defect.  Using a sterile surgical marker, an appropriate advancement flap was drawn incorporating the defect and placing the expected incisions within the relaxed skin tension lines where possible.    The area thus outlined was incised deep to adipose tissue with a #15 scalpel blade.  The skin margins were undermined to an appropriate distance in all directions utilizing iris scissors.
O-T Plasty Text: The defect edges were debeveled with a #15 scalpel blade.  Given the location of the defect, shape of the defect and the proximity to free margins an O-T plasty was deemed most appropriate.  Using a sterile surgical marker, an appropriate O-T plasty was drawn incorporating the defect and placing the expected incisions within the relaxed skin tension lines where possible.    The area thus outlined was incised deep to adipose tissue with a #15 scalpel blade.  The skin margins were undermined to an appropriate distance in all directions utilizing iris scissors.
Advancement-Rotation Flap Text: The defect edges were debeveled with a #15 scalpel blade.  Given the location of the defect, shape of the defect and the proximity to free margins an advancement-rotation flap was deemed most appropriate.  Using a sterile surgical marker, an appropriate flap was drawn incorporating the defect and placing the expected incisions within the relaxed skin tension lines where possible. The area thus outlined was incised deep to adipose tissue with a #15 scalpel blade.  The skin margins were undermined to an appropriate distance in all directions utilizing iris scissors.
Adjacent Tissue Transfer Text: The defect edges were debeveled with a #15 scalpel blade.  Given the location of the defect and the proximity to free margins an adjacent tissue transfer was deemed most appropriate.  Using a sterile surgical marker, an appropriate flap was drawn incorporating the defect and placing the expected incisions within the relaxed skin tension lines where possible.    The area thus outlined was incised deep to adipose tissue with a #15 scalpel blade.  The skin margins were undermined to an appropriate distance in all directions utilizing iris scissors.
Island Pedicle Flap With Canthal Suspension Text: The defect edges were debeveled with a #15 scalpel blade.  Given the location of the defect, shape of the defect and the proximity to free margins an island pedicle advancement flap was deemed most appropriate.  Using a sterile surgical marker, an appropriate advancement flap was drawn incorporating the defect, outlining the appropriate donor tissue and placing the expected incisions within the relaxed skin tension lines where possible. The area thus outlined was incised deep to adipose tissue with a #15 scalpel blade.  The skin margins were undermined to an appropriate distance in all directions around the primary defect and laterally outward around the island pedicle utilizing iris scissors.  There was minimal undermining beneath the pedicle flap. A suspension suture was placed in the canthal tendon to prevent tension and prevent ectropion.
Complex Repair And Xenograft Text: The defect edges were debeveled with a #15 scalpel blade.  The primary defect was closed partially with a complex linear closure.  Given the location of the defect, shape of the defect and the proximity to free margins a xenograft was deemed most appropriate to repair the remaining defect.  The graft was trimmed to fit the size of the remaining defect.  The graft was then placed in the primary defect, oriented appropriately, and sutured into place.
Partial Purse String (Simple) Text: Given the location of the defect and the characteristics of the surrounding skin a simple purse string closure was deemed most appropriate.  Undermining was performed circumferentially around the surgical defect.  A purse string suture was then placed and tightened. Wound tension of the circular defect prevented complete closure of the wound.
Add Superficial Fascia When Documenting Dermal Sutures?: No
Muscle Hinge Flap Text: The defect edges were debeveled with a #15 scalpel blade.  Given the size, depth and location of the defect and the proximity to free margins a muscle hinge flap was deemed most appropriate.  Using a sterile surgical marker, an appropriate hinge flap was drawn incorporating the defect. The area thus outlined was incised with a #15 scalpel blade.  The skin margins were undermined to an appropriate distance in all directions utilizing iris scissors.
Complex Repair And Rhombic Flap Text: The defect edges were debeveled with a #15 scalpel blade.  The primary defect was closed partially with a complex linear closure.  Given the location of the remaining defect, shape of the defect and the proximity to free margins a rhombic flap was deemed most appropriate for complete closure of the defect.  Using a sterile surgical marker, an appropriate advancement flap was drawn incorporating the defect and placing the expected incisions within the relaxed skin tension lines where possible.    The area thus outlined was incised deep to adipose tissue with a #15 scalpel blade.  The skin margins were undermined to an appropriate distance in all directions utilizing iris scissors.
Estimated Blood Loss (Cc): minimal
Surgeon Performing The Repair (Optional): Dr. Quin Wells
Excisional Biopsy Additional Text (Leave Blank If You Do Not Want): The margin was drawn around the clinically apparent lesion. An elliptical shape was then drawn on the skin incorporating the lesion and margins.  Incisions were then made along these lines to the appropriate tissue plane and the lesion was extirpated.
Post-Care Instructions: I reviewed with the patient in detail post-care instructions. Patient is not to engage in any heavy lifting, exercise, or swimming for the next 14 days. Should the patient develop any fevers, chills, bleeding, severe pain patient will contact the office immediately.
Epidermal Sutures: 4-0 Prolene
Island Pedicle Flap-Requiring Vessel Identification Text: The defect edges were debeveled with a #15 scalpel blade.  Given the location of the defect, shape of the defect and the proximity to free margins an island pedicle advancement flap was deemed most appropriate.  Using a sterile surgical marker, an appropriate advancement flap was drawn, based on the axial vessel mentioned above, incorporating the defect, outlining the appropriate donor tissue and placing the expected incisions within the relaxed skin tension lines where possible.    The area thus outlined was incised deep to adipose tissue with a #15 scalpel blade.  The skin margins were undermined to an appropriate distance in all directions around the primary defect and laterally outward around the island pedicle utilizing iris scissors.  There was minimal undermining beneath the pedicle flap.
Estlander Flap (Lower To Upper Lip) Text: The defect of the lower lip was assessed and measured.  Given the location and size of the defect, an Estlander flap was deemed most appropriate. Using a sterile surgical marker, an appropriate Estlander flap was measured and drawn on the upper lip. Local anesthesia was then infiltrated. A scalpel was then used to incise the lateral aspect of the flap, through skin, muscle and mucosa, leaving the flap pedicled medially.  The flap was then rotated and positioned to fill the lower lip defect.  The flap was then sutured into place with a three layer technique, closing the orbicularis oris muscle layer with subcutaneous buried sutures, followed by a mucosal layer and an epidermal layer.
Suturegard Retention Suture: 2-0 Nylon
Burow's Advancement Flap Text: The defect edges were debeveled with a #15 scalpel blade.  Given the location of the defect and the proximity to free margins a Burow's advancement flap was deemed most appropriate.  Using a sterile surgical marker, the appropriate advancement flap was drawn incorporating the defect and placing the expected incisions within the relaxed skin tension lines where possible.    The area thus outlined was incised deep to adipose tissue with a #15 scalpel blade.  The skin margins were undermined to an appropriate distance in all directions utilizing iris scissors.
Alar Island Pedicle Flap Text: The defect edges were debeveled with a #15 scalpel blade.  Given the location of the defect, shape of the defect and the proximity to the alar rim an island pedicle advancement flap was deemed most appropriate.  Using a sterile surgical marker, an appropriate advancement flap was drawn incorporating the defect, outlining the appropriate donor tissue and placing the expected incisions within the nasal ala running parallel to the alar rim. The area thus outlined was incised with a #15 scalpel blade.  The skin margins were undermined minimally to an appropriate distance in all directions around the primary defect and laterally outward around the island pedicle utilizing iris scissors.  There was minimal undermining beneath the pedicle flap.
Burow's Graft Text: The defect edges were debeveled with a #15 scalpel blade.  Given the location of the defect, shape of the defect, the proximity to free margins and the presence of a standing cone deformity a Burow's skin graft was deemed most appropriate. The standing cone was removed and this tissue was then trimmed to the shape of the primary defect. The adipose tissue was also removed until only dermis and epidermis were left.  The skin margins of the secondary defect were undermined to an appropriate distance in all directions utilizing iris scissors.  The secondary defect was closed with interrupted buried subcutaneous sutures.  The skin edges were then re-apposed with running  sutures.  The skin graft was then placed in the primary defect and oriented appropriately.
Melolabial Transposition Flap Text: The defect edges were debeveled with a #15 scalpel blade.  Given the location of the defect and the proximity to free margins a melolabial flap was deemed most appropriate.  Using a sterile surgical marker, an appropriate melolabial transposition flap was drawn incorporating the defect.    The area thus outlined was incised deep to adipose tissue with a #15 scalpel blade.  The skin margins were undermined to an appropriate distance in all directions utilizing iris scissors.
Debridement Text: The wound edges were debrided prior to proceeding with the closure to facilitate wound healing.
Path Notes (To The Dermatopathologist): Please check margins.
Interpolation Flap Text: A decision was made to reconstruct the defect utilizing an interpolation axial flap and a staged reconstruction.  A telfa template was made of the defect.  This telfa template was then used to outline the interpolation flap.  The donor area for the pedicle flap was then injected with anesthesia.  The flap was excised through the skin and subcutaneous tissue down to the layer of the underlying musculature.  The interpolation flap was carefully excised within this deep plane to maintain its blood supply.  The edges of the donor site were undermined.   The donor site was closed in a primary fashion.  The pedicle was then rotated into position and sutured.  Once the tube was sutured into place, adequate blood supply was confirmed with blanching and refill.  The pedicle was then wrapped with xeroform gauze and dressed appropriately with a telfa and gauze bandage to ensure continued blood supply and protect the attached pedicle.
Epidermal Closure Graft Donor Site (Optional): simple interrupted
Z Plasty Text: The lesion was extirpated to the level of the fat with a #15 scalpel blade.  Given the location of the defect, shape of the defect and the proximity to free margins a Z-plasty was deemed most appropriate for repair.  Using a sterile surgical marker, the appropriate transposition arms of the Z-plasty were drawn incorporating the defect and placing the expected incisions within the relaxed skin tension lines where possible.    The area thus outlined was incised deep to adipose tissue with a #15 scalpel blade.  The skin margins were undermined to an appropriate distance in all directions utilizing iris scissors.  The opposing transposition arms were then transposed into place in opposite direction and anchored with interrupted buried subcutaneous sutures.
Peng Advancement Flap Text: The defect edges were debeveled with a #15 scalpel blade.  Given the location of the defect, shape of the defect and the proximity to free margins a Peng advancement flap was deemed most appropriate.  Using a sterile surgical marker, an appropriate advancement flap was drawn incorporating the defect and placing the expected incisions within the relaxed skin tension lines where possible. The area thus outlined was incised deep to adipose tissue with a #15 scalpel blade.  The skin margins were undermined to an appropriate distance in all directions utilizing iris scissors.
Cheek Interpolation Flap Text: A decision was made to reconstruct the defect utilizing an interpolation axial flap and a staged reconstruction.  A telfa template was made of the defect.  This telfa template was then used to outline the Cheek Interpolation flap.  The donor area for the pedicle flap was then injected with anesthesia.  The flap was excised through the skin and subcutaneous tissue down to the layer of the underlying musculature.  The interpolation flap was carefully excised within this deep plane to maintain its blood supply.  The edges of the donor site were undermined.   The donor site was closed in a primary fashion.  The pedicle was then rotated into position and sutured.  Once the tube was sutured into place, adequate blood supply was confirmed with blanching and refill.  The pedicle was then wrapped with xeroform gauze and dressed appropriately with a telfa and gauze bandage to ensure continued blood supply and protect the attached pedicle.
Double Island Pedicle Flap Text: The defect edges were debeveled with a #15 scalpel blade.  Given the location of the defect, shape of the defect and the proximity to free margins a double island pedicle advancement flap was deemed most appropriate.  Using a sterile surgical marker, an appropriate advancement flap was drawn incorporating the defect, outlining the appropriate donor tissue and placing the expected incisions within the relaxed skin tension lines where possible.    The area thus outlined was incised deep to adipose tissue with a #15 scalpel blade.  The skin margins were undermined to an appropriate distance in all directions around the primary defect and laterally outward around the island pedicle utilizing iris scissors.  There was minimal undermining beneath the pedicle flap.
Transposition Flap Text: The defect edges were debeveled with a #15 scalpel blade.  Given the location of the defect and the proximity to free margins a transposition flap was deemed most appropriate.  Using a sterile surgical marker, an appropriate transposition flap was drawn incorporating the defect.    The area thus outlined was incised deep to adipose tissue with a #15 scalpel blade.  The skin margins were undermined to an appropriate distance in all directions utilizing iris scissors.
Anesthesia Type: 1% lidocaine with epinephrine 1:100,000 buffered with 8.4% sodium bicarbonate (1:9 ratio)
Cartilage Graft Text: The defect edges were debeveled with a #15 scalpel blade.  Given the location of the defect, shape of the defect, the fact the defect involved a full thickness cartilage defect a cartilage graft was deemed most appropriate.  An appropriate donor site was identified, cleansed, and anesthetized. The cartilage graft was then harvested and transferred to the recipient site, oriented appropriately and then sutured into place.  The secondary defect was then repaired using a primary closure.
Vermilion Border Text: The closure involved the vermilion border.
Mucosal Advancement Flap Text: Given the location of the defect, shape of the defect and the proximity to free margins a mucosal advancement flap was deemed most appropriate. Incisions were made with a 15 blade scalpel in the appropriate fashion along the cutaneous vermilion border and the mucosal lip. The remaining actinically damaged mucosal tissue was excised.  The mucosal advancement flap was then elevated to the gingival sulcus with care taken to preserve the neurovascular structures and advanced into the primary defect. Care was taken to ensure that precise realignment of the vermilion border was achieved.
Length To Time In Minutes Device Was In Place: 10
Curvilinear Excision Additional Text (Leave Blank If You Do Not Want): The margin was drawn around the clinically apparent lesion.  A curvilinear shape was then drawn on the skin incorporating the lesion and margins.  Incisions were then made along these lines to the appropriate tissue plane and the lesion was extirpated.
O-Z Flap Text: The defect edges were debeveled with a #15 scalpel blade.  Given the location of the defect, shape of the defect and the proximity to free margins an O-Z flap was deemed most appropriate.  Using a sterile surgical marker, an appropriate transposition flap was drawn incorporating the defect and placing the expected incisions within the relaxed skin tension lines where possible. The area thus outlined was incised deep to adipose tissue with a #15 scalpel blade.  The skin margins were undermined to an appropriate distance in all directions utilizing iris scissors.
Size Of Margin In Cm: 0.4
Consent was obtained from the patient. The risks and benefits to therapy were discussed in detail. Specifically, the risks of infection, scarring, bleeding, prolonged wound healing, incomplete removal, allergy to anesthesia, nerve injury and recurrence were addressed. Prior to the procedure, the treatment site was clearly identified and confirmed by the patient. All components of Universal Protocol/PAUSE Rule completed.
Bilateral Rotation Flap Text: The defect edges were debeveled with a #15 scalpel blade. Given the location of the defect, shape of the defect and the proximity to free margins a bilateral rotation flap was deemed most appropriate. Using a sterile surgical marker, an appropriate rotation flap was drawn incorporating the defect and placing the expected incisions within the relaxed skin tension lines where possible. The area thus outlined was incised deep to adipose tissue with a #15 scalpel blade. The skin margins were undermined to an appropriate distance in all directions utilizing iris scissors. Following this, the designed flap was carried over into the primary defect and sutured into place.
Advancement Flap (Double) Text: The defect edges were debeveled with a #15 scalpel blade.  Given the location of the defect and the proximity to free margins a double advancement flap was deemed most appropriate.  Using a sterile surgical marker, the appropriate advancement flaps were drawn incorporating the defect and placing the expected incisions within the relaxed skin tension lines where possible.    The area thus outlined was incised deep to adipose tissue with a #15 scalpel blade.  The skin margins were undermined to an appropriate distance in all directions utilizing iris scissors.
Detail Level: Detailed
Banner Transposition Flap Text: The defect edges were debeveled with a #15 scalpel blade.  Given the location of the defect and the proximity to free margins a Banner transposition flap was deemed most appropriate.  Using a sterile surgical marker, an appropriate flap drawn around the defect. The area thus outlined was incised deep to adipose tissue with a #15 scalpel blade.  The skin margins were undermined to an appropriate distance in all directions utilizing iris scissors.
Mustarde Flap Text: The defect edges were debeveled with a #15 scalpel blade.  Given the size, depth and location of the defect and the proximity to free margins a Mustarde flap was deemed most appropriate.  Using a sterile surgical marker, an appropriate flap was drawn incorporating the defect. The area thus outlined was incised with a #15 scalpel blade.  The skin margins were undermined to an appropriate distance in all directions utilizing iris scissors.
Bilobed Flap Text: The defect edges were debeveled with a #15 scalpel blade.  Given the location of the defect and the proximity to free margins a bilobe flap was deemed most appropriate.  Using a sterile surgical marker, an appropriate bilobe flap drawn around the defect.    The area thus outlined was incised deep to adipose tissue with a #15 scalpel blade.  The skin margins were undermined to an appropriate distance in all directions utilizing iris scissors.
Split-Thickness Skin Graft Text: The defect edges were debeveled with a #15 scalpel blade.  Given the location of the defect, shape of the defect and the proximity to free margins a split thickness skin graft was deemed most appropriate.  Using a sterile surgical marker, the primary defect shape was transferred to the donor site. The split thickness graft was then harvested.  The skin graft was then placed in the primary defect and oriented appropriately.
Complex Repair And Epidermal Autograft Text: The defect edges were debeveled with a #15 scalpel blade.  The primary defect was closed partially with a complex linear closure.  Given the location of the defect, shape of the defect and the proximity to free margins an epidermal autograft was deemed most appropriate to repair the remaining defect.  The graft was trimmed to fit the size of the remaining defect.  The graft was then placed in the primary defect, oriented appropriately, and sutured into place.
Saucerization Excision Additional Text (Leave Blank If You Do Not Want): The margin was drawn around the clinically apparent lesion.  Incisions were then made along these lines, in a tangential fashion, to the appropriate tissue plane and the lesion was extirpated.
Lab: [id number]
Complex Repair And Z Plasty Text: The defect edges were debeveled with a #15 scalpel blade.  The primary defect was closed partially with a complex linear closure.  Given the location of the remaining defect, shape of the defect and the proximity to free margins a Z plasty was deemed most appropriate for complete closure of the defect.  Using a sterile surgical marker, an appropriate advancement flap was drawn incorporating the defect and placing the expected incisions within the relaxed skin tension lines where possible.    The area thus outlined was incised deep to adipose tissue with a #15 scalpel blade.  The skin margins were undermined to an appropriate distance in all directions utilizing iris scissors.
Complex Repair And Split-Thickness Skin Graft Text: The defect edges were debeveled with a #15 scalpel blade.  The primary defect was closed partially with a complex linear closure.  Given the location of the defect, shape of the defect and the proximity to free margins a split thickness skin graft was deemed most appropriate to repair the remaining defect.  The graft was trimmed to fit the size of the remaining defect.  The graft was then placed in the primary defect, oriented appropriately, and sutured into place.
Complex Repair And Modified Advancement Flap Text: The defect edges were debeveled with a #15 scalpel blade.  The primary defect was closed partially with a complex linear closure.  Given the location of the remaining defect, shape of the defect and the proximity to free margins a modified advancement flap was deemed most appropriate for complete closure of the defect.  Using a sterile surgical marker, an appropriate advancement flap was drawn incorporating the defect and placing the expected incisions within the relaxed skin tension lines where possible.    The area thus outlined was incised deep to adipose tissue with a #15 scalpel blade.  The skin margins were undermined to an appropriate distance in all directions utilizing iris scissors.
Complex Repair And Skin Substitute Graft Text: The defect edges were debeveled with a #15 scalpel blade.  The primary defect was closed partially with a complex linear closure.  Given the location of the remaining defect, shape of the defect and the proximity to free margins a skin substitute graft was deemed most appropriate to repair the remaining defect.  The graft was trimmed to fit the size of the remaining defect.  The graft was then placed in the primary defect, oriented appropriately, and sutured into place.
Helical Rim Advancement Flap Text: The defect edges were debeveled with a #15 blade scalpel.  Given the location of the defect and the proximity to free margins (helical rim) a double helical rim advancement flap was deemed most appropriate.  Using a sterile surgical marker, the appropriate advancement flaps were drawn incorporating the defect and placing the expected incisions between the helical rim and antihelix where possible.  The area thus outlined was incised through and through with a #15 scalpel blade.  With a skin hook and iris scissors, the flaps were gently and sharply undermined and freed up.
Partial Purse String (Intermediate) Text: Given the location of the defect and the characteristics of the surrounding skin an intermediate purse string closure was deemed most appropriate.  Undermining was performed circumferentially around the surgical defect.  A purse string suture was then placed and tightened. Wound tension of the circular defect prevented complete closure of the wound.
Dermal Autograft Text: The defect edges were debeveled with a #15 scalpel blade.  Given the location of the defect, shape of the defect and the proximity to free margins a dermal autograft was deemed most appropriate.  Using a sterile surgical marker, the primary defect shape was transferred to the donor site. The area thus outlined was incised deep to adipose tissue with a #15 scalpel blade.  The harvested graft was then trimmed of adipose and epidermal tissue until only dermis was left.  The skin graft was then placed in the primary defect and oriented appropriately.
Nasal Turnover Hinge Flap Text: The defect edges were debeveled with a #15 scalpel blade.  Given the size, depth, location of the defect and the defect being full thickness a nasal turnover hinge flap was deemed most appropriate.  Using a sterile surgical marker, an appropriate hinge flap was drawn incorporating the defect. The area thus outlined was incised with a #15 scalpel blade. The flap was designed to recreate the nasal mucosal lining and the alar rim. The skin margins were undermined to an appropriate distance in all directions utilizing iris scissors.
Double O-Z Flap Text: The defect edges were debeveled with a #15 scalpel blade.  Given the location of the defect, shape of the defect and the proximity to free margins a Double O-Z flap was deemed most appropriate.  Using a sterile surgical marker, an appropriate transposition flap was drawn incorporating the defect and placing the expected incisions within the relaxed skin tension lines where possible. The area thus outlined was incised deep to adipose tissue with a #15 scalpel blade.  The skin margins were undermined to an appropriate distance in all directions utilizing iris scissors.
Complex Repair And Melolabial Flap Text: The defect edges were debeveled with a #15 scalpel blade.  The primary defect was closed partially with a complex linear closure.  Given the location of the remaining defect, shape of the defect and the proximity to free margins a melolabial flap was deemed most appropriate for complete closure of the defect.  Using a sterile surgical marker, an appropriate advancement flap was drawn incorporating the defect and placing the expected incisions within the relaxed skin tension lines where possible.    The area thus outlined was incised deep to adipose tissue with a #15 scalpel blade.  The skin margins were undermined to an appropriate distance in all directions utilizing iris scissors.
Complex Repair And Single Advancement Flap Text: The defect edges were debeveled with a #15 scalpel blade.  The primary defect was closed partially with a complex linear closure.  Given the location of the remaining defect, shape of the defect and the proximity to free margins a single advancement flap was deemed most appropriate for complete closure of the defect.  Using a sterile surgical marker, an appropriate advancement flap was drawn incorporating the defect and placing the expected incisions within the relaxed skin tension lines where possible.    The area thus outlined was incised deep to adipose tissue with a #15 scalpel blade.  The skin margins were undermined to an appropriate distance in all directions utilizing iris scissors.
Additional Anesthesia Volume In Cc: 6
Slit Excision Additional Text (Leave Blank If You Do Not Want): A linear line was drawn on the skin overlying the lesion. An incision was made slowly until the lesion was visualized.  Once visualized, the lesion was removed with blunt dissection.
Mercedes Flap Text: The defect edges were debeveled with a #15 scalpel blade.  Given the location of the defect, shape of the defect and the proximity to free margins a Mercedes flap was deemed most appropriate.  Using a sterile surgical marker, an appropriate advancement flap was drawn incorporating the defect and placing the expected incisions within the relaxed skin tension lines where possible. The area thus outlined was incised deep to adipose tissue with a #15 scalpel blade.  The skin margins were undermined to an appropriate distance in all directions utilizing iris scissors.
Dressing: pressure dressing
Pre-Excision Curettage Text (Leave Blank If You Do Not Want): Prior to drawing the surgical margin the visible lesion was removed with electrodesiccation and curettage to clearly define the lesion size.
Positioning (Leave Blank If You Do Not Want): The patient was placed in a comfortable position exposing the surgical site.
Rhomboid Transposition Flap Text: The defect edges were debeveled with a #15 scalpel blade.  Given the location of the defect and the proximity to free margins a rhomboid transposition flap was deemed most appropriate.  Using a sterile surgical marker, an appropriate rhomboid flap was drawn incorporating the defect.    The area thus outlined was incised deep to adipose tissue with a #15 scalpel blade.  The skin margins were undermined to an appropriate distance in all directions utilizing iris scissors.
Helical Rim Text: The closure involved the helical rim.
Trilobed Flap Text: The defect edges were debeveled with a #15 scalpel blade.  Given the location of the defect and the proximity to free margins a trilobed flap was deemed most appropriate.  Using a sterile surgical marker, an appropriate trilobed flap drawn around the defect.    The area thus outlined was incised deep to adipose tissue with a #15 scalpel blade.  The skin margins were undermined to an appropriate distance in all directions utilizing iris scissors.
Fusiform Excision Additional Text (Leave Blank If You Do Not Want): The margin was drawn around the clinically apparent lesion.  A fusiform shape was then drawn on the skin incorporating the lesion and margins.  Incisions were then made along these lines to the appropriate tissue plane and the lesion was extirpated.
O-T Advancement Flap Text: The defect edges were debeveled with a #15 scalpel blade.  Given the location of the defect, shape of the defect and the proximity to free margins an O-T advancement flap was deemed most appropriate.  Using a sterile surgical marker, an appropriate advancement flap was drawn incorporating the defect and placing the expected incisions within the relaxed skin tension lines where possible.    The area thus outlined was incised deep to adipose tissue with a #15 scalpel blade.  The skin margins were undermined to an appropriate distance in all directions utilizing iris scissors.
Repair Type: Intermediate
Nasalis Myocutaneous Flap Text: Using a #15 blade, an incision was made around the donor flap to the level of the nasalis muscle. Wide lateral undermining was then performed in both the subcutaneous plane above the nasalis muscle, and in a submuscular plane just above periosteum. This allowed the formation of a free nasalis muscle axial pedicle which was still attached to the actual cutaneous flap, increasing its mobility and vascular viability. Hemostasis was obtained with pinpoint electrocoagulation. The flap was mobilized into position and the pivotal anchor points positioned and stabilized with buried interrupted sutures. Subcutaneous and dermal tissues were closed in a multilayered fashion with sutures. Tissue redundancies were excised, and the epidermal edges were apposed without significant tension and sutured with sutures.
Dorsal Nasal Flap Text: The defect edges were debeveled with a #15 scalpel blade.  Given the location of the defect and the proximity to free margins a dorsal nasal flap was deemed most appropriate.  Using a sterile surgical marker, an appropriate dorsal nasal flap was drawn around the defect.    The area thus outlined was incised deep to adipose tissue with a #15 scalpel blade.  The skin margins were undermined to an appropriate distance in all directions utilizing iris scissors.
Purse String (Intermediate) Text: Given the location of the defect and the characteristics of the surrounding skin a purse string intermediate closure was deemed most appropriate.  Undermining was performed circumfirentially around the surgical defect.  A purse string suture was then placed and tightened.
Information: Selecting Yes will display possible errors in your note based on the variables you have selected. This validation is only offered as a suggestion for you. PLEASE NOTE THAT THE VALIDATION TEXT WILL BE REMOVED WHEN YOU FINALIZE YOUR NOTE. IF YOU WANT TO FAX A PRELIMINARY NOTE YOU WILL NEED TO TOGGLE THIS TO 'NO' IF YOU DO NOT WANT IT IN YOUR FAXED NOTE.
Complex Repair And Tissue Cultured Epidermal Autograft Text: The defect edges were debeveled with a #15 scalpel blade.  The primary defect was closed partially with a complex linear closure.  Given the location of the defect, shape of the defect and the proximity to free margins an tissue cultured epidermal autograft was deemed most appropriate to repair the remaining defect.  The graft was trimmed to fit the size of the remaining defect.  The graft was then placed in the primary defect, oriented appropriately, and sutured into place.
Complex Repair And Dorsal Nasal Flap Text: The defect edges were debeveled with a #15 scalpel blade.  The primary defect was closed partially with a complex linear closure.  Given the location of the remaining defect, shape of the defect and the proximity to free margins a dorsal nasal flap was deemed most appropriate for complete closure of the defect.  Using a sterile surgical marker, an appropriate flap was drawn incorporating the defect and placing the expected incisions within the relaxed skin tension lines where possible.    The area thus outlined was incised deep to adipose tissue with a #15 scalpel blade.  The skin margins were undermined to an appropriate distance in all directions utilizing iris scissors.
Repair Performed By Another Provider Text (Leave Blank If You Do Not Want): After the tissue was excised the defect was repaired by another provider.
Retention Suture Bite Size: 3 mm
Lip Wedge Excision Repair Text: Given the location of the defect and the proximity to free margins a full thickness wedge repair was deemed most appropriate.  Using a sterile surgical marker, the appropriate repair was drawn incorporating the defect and placing the expected incisions perpendicular to the vermilion border.  The vermilion border was also meticulously outlined to ensure appropriate reapproximation during the repair.  The area thus outlined was incised through and through with a #15 scalpel blade.  The muscularis and dermis were reaproximated with deep sutures following hemostasis. Care was taken to realign the vermilion border before proceeding with the superficial closure.  Once the vermilion was realigned the superfical and mucosal closure was finished.
Complex Repair And Rotation Flap Text: The defect edges were debeveled with a #15 scalpel blade.  The primary defect was closed partially with a complex linear closure.  Given the location of the remaining defect, shape of the defect and the proximity to free margins a rotation flap was deemed most appropriate for complete closure of the defect.  Using a sterile surgical marker, an appropriate advancement flap was drawn incorporating the defect and placing the expected incisions within the relaxed skin tension lines where possible.    The area thus outlined was incised deep to adipose tissue with a #15 scalpel blade.  The skin margins were undermined to an appropriate distance in all directions utilizing iris scissors.
Keystone Flap Text: The defect edges were debeveled with a #15 scalpel blade.  Given the location of the defect, shape of the defect a keystone flap was deemed most appropriate.  Using a sterile surgical marker, an appropriate keystone flap was drawn incorporating the defect, outlining the appropriate donor tissue and placing the expected incisions within the relaxed skin tension lines where possible. The area thus outlined was incised deep to adipose tissue with a #15 scalpel blade.  The skin margins were undermined to an appropriate distance in all directions around the primary defect and laterally outward around the flap utilizing iris scissors.
No Repair - Repaired With Adjacent Surgical Defect Text (Leave Blank If You Do Not Want): After the excision the defect was repaired concurrently with another surgical defect which was in close approximation.
Wound Care: Vaseline
Pinch Graft Text: The defect edges were debeveled with a #15 scalpel blade. Given the location of the defect, shape of the defect and the proximity to free margins a pinch graft was deemed most appropriate. Using a sterile surgical marker, the primary defect shape was transferred to the donor site. The area thus outlined was incised deep to adipose tissue with a #15 scalpel blade.  The harvested graft was then trimmed of adipose tissue until only dermis and epidermis was left. The skin margins of the secondary defect were undermined to an appropriate distance in all directions utilizing iris scissors.  The secondary defect was closed with interrupted buried subcutaneous sutures.  The skin edges were then re-apposed with running  sutures.  The skin graft was then placed in the primary defect and oriented appropriately.
Mastoid Interpolation Flap Text: A decision was made to reconstruct the defect utilizing an interpolation axial flap and a staged reconstruction.  A telfa template was made of the defect.  This telfa template was then used to outline the mastoid interpolation flap.  The donor area for the pedicle flap was then injected with anesthesia.  The flap was excised through the skin and subcutaneous tissue down to the layer of the underlying musculature.  The pedicle flap was carefully excised within this deep plane to maintain its blood supply.  The edges of the donor site were undermined.   The donor site was closed in a primary fashion.  The pedicle was then rotated into position and sutured.  Once the tube was sutured into place, adequate blood supply was confirmed with blanching and refill.  The pedicle was then wrapped with xeroform gauze and dressed appropriately with a telfa and gauze bandage to ensure continued blood supply and protect the attached pedicle.
Epidermal Autograft Text: The defect edges were debeveled with a #15 scalpel blade.  Given the location of the defect, shape of the defect and the proximity to free margins an epidermal autograft was deemed most appropriate.  Using a sterile surgical marker, the primary defect shape was transferred to the donor site. The epidermal graft was then harvested.  The skin graft was then placed in the primary defect and oriented appropriately.
Spiral Flap Text: The defect edges were debeveled with a #15 scalpel blade.  Given the location of the defect, shape of the defect and the proximity to free margins a spiral flap was deemed most appropriate.  Using a sterile surgical marker, an appropriate rotation flap was drawn incorporating the defect and placing the expected incisions within the relaxed skin tension lines where possible. The area thus outlined was incised deep to adipose tissue with a #15 scalpel blade.  The skin margins were undermined to an appropriate distance in all directions utilizing iris scissors.
Rotation Flap Text: The defect edges were debeveled with a #15 scalpel blade.  Given the location of the defect, shape of the defect and the proximity to free margins a rotation flap was deemed most appropriate.  Using a sterile surgical marker, an appropriate rotation flap was drawn incorporating the defect and placing the expected incisions within the relaxed skin tension lines where possible.    The area thus outlined was incised deep to adipose tissue with a #15 scalpel blade.  The skin margins were undermined to an appropriate distance in all directions utilizing iris scissors.
Suturegard Intro: Intraoperative tissue expansion was performed, utilizing the SUTUREGARD device, in order to reduce wound tension.
Undermining Type: Entire Wound
V-Y Plasty Text: The defect edges were debeveled with a #15 scalpel blade.  Given the location of the defect, shape of the defect and the proximity to free margins an V-Y advancement flap was deemed most appropriate.  Using a sterile surgical marker, an appropriate advancement flap was drawn incorporating the defect and placing the expected incisions within the relaxed skin tension lines where possible.    The area thus outlined was incised deep to adipose tissue with a #15 scalpel blade.  The skin margins were undermined to an appropriate distance in all directions utilizing iris scissors.
Complex Repair And V-Y Plasty Text: The defect edges were debeveled with a #15 scalpel blade.  The primary defect was closed partially with a complex linear closure.  Given the location of the remaining defect, shape of the defect and the proximity to free margins a V-Y plasty was deemed most appropriate for complete closure of the defect.  Using a sterile surgical marker, an appropriate advancement flap was drawn incorporating the defect and placing the expected incisions within the relaxed skin tension lines where possible.    The area thus outlined was incised deep to adipose tissue with a #15 scalpel blade.  The skin margins were undermined to an appropriate distance in all directions utilizing iris scissors.
Cheek-To-Nose Interpolation Flap Text: A decision was made to reconstruct the defect utilizing an interpolation axial flap and a staged reconstruction.  A telfa template was made of the defect.  This telfa template was then used to outline the Cheek-To-Nose Interpolation flap.  The donor area for the pedicle flap was then injected with anesthesia.  The flap was excised through the skin and subcutaneous tissue down to the layer of the underlying musculature.  The interpolation flap was carefully excised within this deep plane to maintain its blood supply.  The edges of the donor site were undermined.   The donor site was closed in a primary fashion.  The pedicle was then rotated into position and sutured.  Once the tube was sutured into place, adequate blood supply was confirmed with blanching and refill.  The pedicle was then wrapped with xeroform gauze and dressed appropriately with a telfa and gauze bandage to ensure continued blood supply and protect the attached pedicle.
Bilobed Transposition Flap Text: The defect edges were debeveled with a #15 scalpel blade.  Given the location of the defect and the proximity to free margins a bilobed transposition flap was deemed most appropriate.  Using a sterile surgical marker, an appropriate bilobe flap drawn around the defect.    The area thus outlined was incised deep to adipose tissue with a #15 scalpel blade.  The skin margins were undermined to an appropriate distance in all directions utilizing iris scissors.
Complex Repair And Double M Plasty Text: The defect edges were debeveled with a #15 scalpel blade.  The primary defect was closed partially with a complex linear closure.  Given the location of the remaining defect, shape of the defect and the proximity to free margins a double M plasty was deemed most appropriate for complete closure of the defect.  Using a sterile surgical marker, an appropriate advancement flap was drawn incorporating the defect and placing the expected incisions within the relaxed skin tension lines where possible.    The area thus outlined was incised deep to adipose tissue with a #15 scalpel blade.  The skin margins were undermined to an appropriate distance in all directions utilizing iris scissors.
Paramedian Forehead Flap Text: A decision was made to reconstruct the defect utilizing an interpolation axial flap and a staged reconstruction.  A telfa template was made of the defect.  This telfa template was then used to outline the paramedian forehead pedicle flap.  The donor area for the pedicle flap was then injected with anesthesia.  The flap was excised through the skin and subcutaneous tissue down to the layer of the underlying musculature.  The pedicle flap was carefully excised within this deep plane to maintain its blood supply.  The edges of the donor site were undermined.   The donor site was closed in a primary fashion.  The pedicle was then rotated into position and sutured.  Once the tube was sutured into place, adequate blood supply was confirmed with blanching and refill.  The pedicle was then wrapped with xeroform gauze and dressed appropriately with a telfa and gauze bandage to ensure continued blood supply and protect the attached pedicle.
Epidermal Closure: running
Posterior Auricular Interpolation Flap Text: A decision was made to reconstruct the defect utilizing an interpolation axial flap and a staged reconstruction.  A telfa template was made of the defect.  This telfa template was then used to outline the posterior auricular interpolation flap.  The donor area for the pedicle flap was then injected with anesthesia.  The flap was excised through the skin and subcutaneous tissue down to the layer of the underlying musculature.  The pedicle flap was carefully excised within this deep plane to maintain its blood supply.  The edges of the donor site were undermined.   The donor site was closed in a primary fashion.  The pedicle was then rotated into position and sutured.  Once the tube was sutured into place, adequate blood supply was confirmed with blanching and refill.  The pedicle was then wrapped with xeroform gauze and dressed appropriately with a telfa and gauze bandage to ensure continued blood supply and protect the attached pedicle.
Complex Repair And O-T Advancement Flap Text: The defect edges were debeveled with a #15 scalpel blade.  The primary defect was closed partially with a complex linear closure.  Given the location of the remaining defect, shape of the defect and the proximity to free margins an O-T advancement flap was deemed most appropriate for complete closure of the defect.  Using a sterile surgical marker, an appropriate advancement flap was drawn incorporating the defect and placing the expected incisions within the relaxed skin tension lines where possible.    The area thus outlined was incised deep to adipose tissue with a #15 scalpel blade.  The skin margins were undermined to an appropriate distance in all directions utilizing iris scissors.
Modified Advancement Flap Text: The defect edges were debeveled with a #15 scalpel blade.  Given the location of the defect, shape of the defect and the proximity to free margins a modified advancement flap was deemed most appropriate.  Using a sterile surgical marker, an appropriate advancement flap was drawn incorporating the defect and placing the expected incisions within the relaxed skin tension lines where possible.    The area thus outlined was incised deep to adipose tissue with a #15 scalpel blade.  The skin margins were undermined to an appropriate distance in all directions utilizing iris scissors.
Saucerization Depth: dermis and superficial adipose tissue
Skin Substitute Text: The defect edges were debeveled with a #15 scalpel blade.  Given the location of the defect, shape of the defect and the proximity to free margins a skin substitute graft was deemed most appropriate.  The graft material was trimmed to fit the size of the defect. The graft was then placed in the primary defect and oriented appropriately.
H Plasty Text: Given the location of the defect, shape of the defect and the proximity to free margins a H-plasty was deemed most appropriate for repair.  Using a sterile surgical marker, the appropriate advancement arms of the H-plasty were drawn incorporating the defect and placing the expected incisions within the relaxed skin tension lines where possible. The area thus outlined was incised deep to adipose tissue with a #15 scalpel blade. The skin margins were undermined to an appropriate distance in all directions utilizing iris scissors.  The opposing advancement arms were then advanced into place in opposite direction and anchored with interrupted buried subcutaneous sutures.
Abbe Flap (Upper To Lower Lip) Text: The defect of the lower lip was assessed and measured.  Given the location and size of the defect, an Abbe flap was deemed most appropriate. Using a sterile surgical marker, an appropriate Abbe flap was measured and drawn on the upper lip. Local anesthesia was then infiltrated.  A scalpel was then used to incise the upper lip through and through the skin, vermilion, muscle and mucosa, leaving the flap pedicled on the opposite side.  The flap was then rotated and transferred to the lower lip defect.  The flap was then sutured into place with a three layer technique, closing the orbicularis oris muscle layer with subcutaneous buried sutures, followed by a mucosal layer and an epidermal layer.
Complex Repair And W Plasty Text: The defect edges were debeveled with a #15 scalpel blade.  The primary defect was closed partially with a complex linear closure.  Given the location of the remaining defect, shape of the defect and the proximity to free margins a W plasty was deemed most appropriate for complete closure of the defect.  Using a sterile surgical marker, an appropriate advancement flap was drawn incorporating the defect and placing the expected incisions within the relaxed skin tension lines where possible.    The area thus outlined was incised deep to adipose tissue with a #15 scalpel blade.  The skin margins were undermined to an appropriate distance in all directions utilizing iris scissors.
Where Do You Want The Question To Include Opioid Counseling Located?: Case Summary Tab
Excision Method: Elliptical
Double Z Plasty Text: The lesion was extirpated to the level of the fat with a #15 scalpel blade. Given the location of the defect, shape of the defect and the proximity to free margins a double Z-plasty was deemed most appropriate for repair. Using a sterile surgical marker, the appropriate transposition arms of the double Z-plasty were drawn incorporating the defect and placing the expected incisions within the relaxed skin tension lines where possible. The area thus outlined was incised deep to adipose tissue with a #15 scalpel blade. The skin margins were undermined to an appropriate distance in all directions utilizing iris scissors. The opposing transposition arms were then transposed and carried over into place in opposite direction and anchored with interrupted buried subcutaneous sutures.
Deep Sutures: 4-0 Vicryl
Intermediate / Complex Repair - Final Wound Length In Cm: 2.7
Crescentic Advancement Flap Text: The defect edges were debeveled with a #15 scalpel blade.  Given the location of the defect and the proximity to free margins a crescentic advancement flap was deemed most appropriate.  Using a sterile surgical marker, the appropriate advancement flap was drawn incorporating the defect and placing the expected incisions within the relaxed skin tension lines where possible.    The area thus outlined was incised deep to adipose tissue with a #15 scalpel blade.  The skin margins were undermined to an appropriate distance in all directions utilizing iris scissors.
O-Z Plasty Text: The defect edges were debeveled with a #15 scalpel blade.  Given the location of the defect, shape of the defect and the proximity to free margins an O-Z plasty (double transposition flap) was deemed most appropriate.  Using a sterile surgical marker, the appropriate transposition flaps were drawn incorporating the defect and placing the expected incisions within the relaxed skin tension lines where possible.    The area thus outlined was incised deep to adipose tissue with a #15 scalpel blade.  The skin margins were undermined to an appropriate distance in all directions utilizing iris scissors.  Hemostasis was achieved with electrocautery.  The flaps were then transposed into place, one clockwise and the other counterclockwise, and anchored with interrupted buried subcutaneous sutures.
Complex Repair And M Plasty Text: The defect edges were debeveled with a #15 scalpel blade.  The primary defect was closed partially with a complex linear closure.  Given the location of the remaining defect, shape of the defect and the proximity to free margins an M plasty was deemed most appropriate for complete closure of the defect.  Using a sterile surgical marker, an appropriate advancement flap was drawn incorporating the defect and placing the expected incisions within the relaxed skin tension lines where possible.    The area thus outlined was incised deep to adipose tissue with a #15 scalpel blade.  The skin margins were undermined to an appropriate distance in all directions utilizing iris scissors.
Ftsg Text: The defect edges were debeveled with a #15 scalpel blade.  Given the location of the defect, shape of the defect and the proximity to free margins a full thickness skin graft was deemed most appropriate.  Using a sterile surgical marker, the primary defect shape was transferred to the donor site. The area thus outlined was incised deep to adipose tissue with a #15 scalpel blade.  The harvested graft was then trimmed of adipose tissue until only dermis and epidermis was left.  The skin margins of the secondary defect were undermined to an appropriate distance in all directions utilizing iris scissors.  The secondary defect was closed with interrupted buried subcutaneous sutures.  The skin edges were then re-apposed with running  sutures.  The skin graft was then placed in the primary defect and oriented appropriately.
Hatchet Flap Text: The defect edges were debeveled with a #15 scalpel blade.  Given the location of the defect, shape of the defect and the proximity to free margins a hatchet flap was deemed most appropriate.  Using a sterile surgical marker, an appropriate hatchet flap was drawn incorporating the defect and placing the expected incisions within the relaxed skin tension lines where possible.    The area thus outlined was incised deep to adipose tissue with a #15 scalpel blade.  The skin margins were undermined to an appropriate distance in all directions utilizing iris scissors.
Hemostasis: Electrodesiccation
Zygomaticofacial Flap Text: Given the location of the defect, shape of the defect and the proximity to free margins a zygomaticofacial flap was deemed most appropriate for repair.  Using a sterile surgical marker, the appropriate flap was drawn incorporating the defect and placing the expected incisions within the relaxed skin tension lines where possible. The area thus outlined was incised deep to adipose tissue with a #15 scalpel blade with preservation of a vascular pedicle.  The skin margins were undermined to an appropriate distance in all directions utilizing iris scissors.  The flap was then placed into the defect and anchored with interrupted buried subcutaneous sutures.
Purse String (Simple) Text: Given the location of the defect and the characteristics of the surrounding skin a purse string simple closure was deemed most appropriate.  Undermining was performed circumferentially around the surgical defect.  A purse string suture was then placed and tightened.
V-Y Flap Text: The defect edges were debeveled with a #15 scalpel blade.  Given the location of the defect, shape of the defect and the proximity to free margins a V-Y flap was deemed most appropriate.  Using a sterile surgical marker, an appropriate advancement flap was drawn incorporating the defect and placing the expected incisions within the relaxed skin tension lines where possible.    The area thus outlined was incised deep to adipose tissue with a #15 scalpel blade.  The skin margins were undermined to an appropriate distance in all directions utilizing iris scissors.
O-L Flap Text: The defect edges were debeveled with a #15 scalpel blade.  Given the location of the defect, shape of the defect and the proximity to free margins an O-L flap was deemed most appropriate.  Using a sterile surgical marker, an appropriate advancement flap was drawn incorporating the defect and placing the expected incisions within the relaxed skin tension lines where possible.    The area thus outlined was incised deep to adipose tissue with a #15 scalpel blade.  The skin margins were undermined to an appropriate distance in all directions utilizing iris scissors.
Ear Star Wedge Flap Text: The defect edges were debeveled with a #15 blade scalpel.  Given the location of the defect and the proximity to free margins (helical rim) an ear star wedge flap was deemed most appropriate.  Using a sterile surgical marker, the appropriate flap was drawn incorporating the defect and placing the expected incisions between the helical rim and antihelix where possible.  The area thus outlined was incised through and through with a #15 scalpel blade.
Scalpel Size: 15 blade
Complex Repair And Transposition Flap Text: The defect edges were debeveled with a #15 scalpel blade.  The primary defect was closed partially with a complex linear closure.  Given the location of the remaining defect, shape of the defect and the proximity to free margins a transposition flap was deemed most appropriate for complete closure of the defect.  Using a sterile surgical marker, an appropriate advancement flap was drawn incorporating the defect and placing the expected incisions within the relaxed skin tension lines where possible.    The area thus outlined was incised deep to adipose tissue with a #15 scalpel blade.  The skin margins were undermined to an appropriate distance in all directions utilizing iris scissors.
Nostril Rim Text: The closure involved the nostril rim.
Anesthesia Type: 1% lidocaine with 1:100,000 epinephrine and a 1:10 solution of 8.4% sodium bicarbonate
Orbicularis Oris Muscle Flap Text: The defect edges were debeveled with a #15 scalpel blade.  Given that the defect affected the competency of the oral sphincter an orbicularis oris muscle flap was deemed most appropriate to restore this competency and normal muscle function.  Using a sterile surgical marker, an appropriate flap was drawn incorporating the defect. The area thus outlined was incised with a #15 scalpel blade.
Complex Repair And Ftsg Text: The defect edges were debeveled with a #15 scalpel blade.  The primary defect was closed partially with a complex linear closure.  Given the location of the defect, shape of the defect and the proximity to free margins a full thickness skin graft was deemed most appropriate to repair the remaining defect.  The graft was trimmed to fit the size of the remaining defect.  The graft was then placed in the primary defect, oriented appropriately, and sutured into place.
Chonodrocutaneous Helical Advancement Flap Text: The defect edges were debeveled with a #15 scalpel blade.  Given the location of the defect and the proximity to free margins a chondrocutaneous helical advancement flap was deemed most appropriate.  Using a sterile surgical marker, the appropriate advancement flap was drawn incorporating the defect and placing the expected incisions within the relaxed skin tension lines where possible.    The area thus outlined was incised deep to adipose tissue with a #15 scalpel blade.  The skin margins were undermined to an appropriate distance in all directions utilizing iris scissors.
Abbe Flap (Lower To Upper Lip) Text: The defect of the upper lip was assessed and measured.  Given the location and size of the defect, an Abbe flap was deemed most appropriate. Using a sterile surgical marker, an appropriate Abbe flap was measured and drawn on the lower lip. Local anesthesia was then infiltrated. A scalpel was then used to incise the upper lip through and through the skin, vermilion, muscle and mucosa, leaving the flap pedicled on the opposite side.  The flap was then rotated and transferred to the lower lip defect.  The flap was then sutured into place with a three layer technique, closing the orbicularis oris muscle layer with subcutaneous buried sutures, followed by a mucosal layer and an epidermal layer.
Composite Graft Text: The defect edges were debeveled with a #15 scalpel blade.  Given the location of the defect, shape of the defect, the proximity to free margins and the fact the defect was full thickness a composite graft was deemed most appropriate.  The defect was outline and then transferred to the donor site.  A full thickness graft was then excised from the donor site. The graft was then placed in the primary defect, oriented appropriately and then sutured into place.  The secondary defect was then repaired using a primary closure.
A-T Advancement Flap Text: The defect edges were debeveled with a #15 scalpel blade.  Given the location of the defect, shape of the defect and the proximity to free margins an A-T advancement flap was deemed most appropriate.  Using a sterile surgical marker, an appropriate advancement flap was drawn incorporating the defect and placing the expected incisions within the relaxed skin tension lines where possible.    The area thus outlined was incised deep to adipose tissue with a #15 scalpel blade.  The skin margins were undermined to an appropriate distance in all directions utilizing iris scissors.
Size Of Lesion In Cm: 0.5
Retention Suture Text: Retention sutures were placed to support the closure and prevent dehiscence.
Staged Advancement Flap Text: The defect edges were debeveled with a #15 scalpel blade.  Given the location of the defect, shape of the defect and the proximity to free margins a staged advancement flap was deemed most appropriate.  Using a sterile surgical marker, an appropriate advancement flap was drawn incorporating the defect and placing the expected incisions within the relaxed skin tension lines where possible. The area thus outlined was incised deep to adipose tissue with a #15 scalpel blade.  The skin margins were undermined to an appropriate distance in all directions utilizing iris scissors.
Xenograft Text: The defect edges were debeveled with a #15 scalpel blade.  Given the location of the defect, shape of the defect and the proximity to free margins a xenograft was deemed most appropriate.  The graft was then trimmed to fit the size of the defect.  The graft was then placed in the primary defect and oriented appropriately.
Billing Type: Third-Party Bill
Elliptical Excision Additional Text (Leave Blank If You Do Not Want): The margin was drawn around the clinically apparent lesion.  An elliptical shape was then drawn on the skin incorporating the lesion and margins.  Incisions were then made along these lines to the appropriate tissue plane and the lesion was extirpated.
Hemigard Postcare Instructions: The HEMIGARD strips are to remain completely dry for at least 5-7 days.
Nasalis-Muscle-Based Myocutaneous Island Pedicle Flap Text: Using a #15 blade, an incision was made around the donor flap to the level of the nasalis muscle. Wide lateral undermining was then performed in both the subcutaneous plane above the nasalis muscle, and in a submuscular plane just above periosteum. This allowed the formation of a free nasalis muscle axial pedicle (based on the angular artery) which was still attached to the actual cutaneous flap, increasing its mobility and vascular viability. Hemostasis was obtained with pinpoint electrocoagulation. The flap was mobilized into position and the pivotal anchor points positioned and stabilized with buried interrupted sutures. Subcutaneous and dermal tissues were closed in a multilayered fashion with sutures. Tissue redundancies were excised, and the epidermal edges were apposed without significant tension and sutured with sutures.
Bilateral Helical Rim Advancement Flap Text: The defect edges were debeveled with a #15 blade scalpel.  Given the location of the defect and the proximity to free margins (helical rim) a bilateral helical rim advancement flap was deemed most appropriate.  Using a sterile surgical marker, the appropriate advancement flaps were drawn incorporating the defect and placing the expected incisions between the helical rim and antihelix where possible.  The area thus outlined was incised through and through with a #15 scalpel blade.  With a skin hook and iris scissors, the flaps were gently and sharply undermined and freed up.
Advancement Flap (Single) Text: The defect edges were debeveled with a #15 scalpel blade.  Given the location of the defect and the proximity to free margins a single advancement flap was deemed most appropriate.  Using a sterile surgical marker, an appropriate advancement flap was drawn incorporating the defect and placing the expected incisions within the relaxed skin tension lines where possible.    The area thus outlined was incised deep to adipose tissue with a #15 scalpel blade.  The skin margins were undermined to an appropriate distance in all directions utilizing iris scissors.
Excision Depth: adipose tissue
Star Wedge Flap Text: The defect edges were debeveled with a #15 scalpel blade.  Given the location of the defect, shape of the defect and the proximity to free margins a star wedge flap was deemed most appropriate.  Using a sterile surgical marker, an appropriate rotation flap was drawn incorporating the defect and placing the expected incisions within the relaxed skin tension lines where possible. The area thus outlined was incised deep to adipose tissue with a #15 scalpel blade.  The skin margins were undermined to an appropriate distance in all directions utilizing iris scissors.
Rhombic Flap Text: The defect edges were debeveled with a #15 scalpel blade.  Given the location of the defect and the proximity to free margins a rhombic flap was deemed most appropriate.  Using a sterile surgical marker, an appropriate rhombic flap was drawn incorporating the defect.    The area thus outlined was incised deep to adipose tissue with a #15 scalpel blade.  The skin margins were undermined to an appropriate distance in all directions utilizing iris scissors.
Complex Repair And Burow's Graft Text: The defect edges were debeveled with a #15 scalpel blade.  The primary defect was closed partially with a complex linear closure.  Given the location of the defect, shape of the defect, the proximity to free margins and the presence of a standing cone deformity a Burow's graft was deemed most appropriate to repair the remaining defect.  The graft was trimmed to fit the size of the remaining defect.  The graft was then placed in the primary defect, oriented appropriately, and sutured into place.
Bi-Rhombic Flap Text: The defect edges were debeveled with a #15 scalpel blade.  Given the location of the defect and the proximity to free margins a bi-rhombic flap was deemed most appropriate.  Using a sterile surgical marker, an appropriate rhombic flap was drawn incorporating the defect. The area thus outlined was incised deep to adipose tissue with a #15 scalpel blade.  The skin margins were undermined to an appropriate distance in all directions utilizing iris scissors.
Complex Repair And Bilobe Flap Text: The defect edges were debeveled with a #15 scalpel blade.  The primary defect was closed partially with a complex linear closure.  Given the location of the remaining defect, shape of the defect and the proximity to free margins a bilobe flap was deemed most appropriate for complete closure of the defect.  Using a sterile surgical marker, an appropriate advancement flap was drawn incorporating the defect and placing the expected incisions within the relaxed skin tension lines where possible.    The area thus outlined was incised deep to adipose tissue with a #15 scalpel blade.  The skin margins were undermined to an appropriate distance in all directions utilizing iris scissors.
Eye Clamp Note Details: An eye clamp was used during the procedure.
Complex Repair And Dermal Autograft Text: The defect edges were debeveled with a #15 scalpel blade.  The primary defect was closed partially with a complex linear closure.  Given the location of the defect, shape of the defect and the proximity to free margins an dermal autograft was deemed most appropriate to repair the remaining defect.  The graft was trimmed to fit the size of the remaining defect.  The graft was then placed in the primary defect, oriented appropriately, and sutured into place.
Melolabial Interpolation Flap Text: A decision was made to reconstruct the defect utilizing an interpolation axial flap and a staged reconstruction.  A telfa template was made of the defect.  This telfa template was then used to outline the melolabial interpolation flap.  The donor area for the pedicle flap was then injected with anesthesia.  The flap was excised through the skin and subcutaneous tissue down to the layer of the underlying musculature.  The pedicle flap was carefully excised within this deep plane to maintain its blood supply.  The edges of the donor site were undermined.   The donor site was closed in a primary fashion.  The pedicle was then rotated into position and sutured.  Once the tube was sutured into place, adequate blood supply was confirmed with blanching and refill.  The pedicle was then wrapped with xeroform gauze and dressed appropriately with a telfa and gauze bandage to ensure continued blood supply and protect the attached pedicle.
Lab Facility: [id number]
Rectangular Flap Text: The defect edges were debeveled with a #15 scalpel blade. Given the location of the defect and the proximity to free margins a rectangular flap was deemed most appropriate. Using a sterile surgical marker, an appropriate rectangular flap was drawn incorporating the defect. The area thus outlined was incised deep to adipose tissue with a #15 scalpel blade. The skin margins were undermined to an appropriate distance in all directions utilizing iris scissors. Following this, the designed flap was carried over into the primary defect and sutured into place.
Complex Repair And Double Advancement Flap Text: The defect edges were debeveled with a #15 scalpel blade.  The primary defect was closed partially with a complex linear closure.  Given the location of the remaining defect, shape of the defect and the proximity to free margins a double advancement flap was deemed most appropriate for complete closure of the defect.  Using a sterile surgical marker, an appropriate advancement flap was drawn incorporating the defect and placing the expected incisions within the relaxed skin tension lines where possible.    The area thus outlined was incised deep to adipose tissue with a #15 scalpel blade.  The skin margins were undermined to an appropriate distance in all directions utilizing iris scissors.
Complex Repair And O-L Flap Text: The defect edges were debeveled with a #15 scalpel blade.  The primary defect was closed partially with a complex linear closure.  Given the location of the remaining defect, shape of the defect and the proximity to free margins an O-L flap was deemed most appropriate for complete closure of the defect.  Using a sterile surgical marker, an appropriate flap was drawn incorporating the defect and placing the expected incisions within the relaxed skin tension lines where possible.    The area thus outlined was incised deep to adipose tissue with a #15 scalpel blade.  The skin margins were undermined to an appropriate distance in all directions utilizing iris scissors.
Suturegard Body: The suture ends were repeatedly re-tightened and re-clamped to achieve the desired tissue expansion.
W Plasty Text: The lesion was extirpated to the level of the fat with a #15 scalpel blade.  Given the location of the defect, shape of the defect and the proximity to free margins a W-plasty was deemed most appropriate for repair.  Using a sterile surgical marker, the appropriate transposition arms of the W-plasty were drawn incorporating the defect and placing the expected incisions within the relaxed skin tension lines where possible.    The area thus outlined was incised deep to adipose tissue with a #15 scalpel blade.  The skin margins were undermined to an appropriate distance in all directions utilizing iris scissors.  The opposing transposition arms were then transposed into place in opposite direction and anchored with interrupted buried subcutaneous sutures.
Tissue Cultured Epidermal Autograft Text: The defect edges were debeveled with a #15 scalpel blade.  Given the location of the defect, shape of the defect and the proximity to free margins a tissue cultured epidermal autograft was deemed most appropriate.  The graft was then trimmed to fit the size of the defect.  The graft was then placed in the primary defect and oriented appropriately.
Perilesional Excision Additional Text (Leave Blank If You Do Not Want): The margin was drawn around the clinically apparent lesion. Incisions were then made along these lines to the appropriate tissue plane and the lesion was extirpated.
Double O-Z Plasty Text: The defect edges were debeveled with a #15 scalpel blade.  Given the location of the defect, shape of the defect and the proximity to free margins a Double O-Z plasty (double transposition flap) was deemed most appropriate.  Using a sterile surgical marker, the appropriate transposition flaps were drawn incorporating the defect and placing the expected incisions within the relaxed skin tension lines where possible. The area thus outlined was incised deep to adipose tissue with a #15 scalpel blade.  The skin margins were undermined to an appropriate distance in all directions utilizing iris scissors.  Hemostasis was achieved with electrocautery.  The flaps were then transposed into place, one clockwise and the other counterclockwise, and anchored with interrupted buried subcutaneous sutures.
Repair Depth: use same depth as excision depth

## 2024-05-24 ENCOUNTER — APPOINTMENT (OUTPATIENT)
Dept: URBAN - METROPOLITAN AREA SURGERY 12 | Age: 69
Setting detail: DERMATOLOGY
End: 2024-05-24

## 2024-05-24 DIAGNOSIS — Z48.02 ENCOUNTER FOR REMOVAL OF SUTURES: ICD-10-CM

## 2024-05-24 PROCEDURE — OTHER SUTURE REMOVAL (GLOBAL PERIOD): OTHER

## 2024-05-24 PROCEDURE — OTHER COUNSELING: OTHER

## 2024-05-24 ASSESSMENT — LOCATION DETAILED DESCRIPTION DERM: LOCATION DETAILED: MIDDLE STERNUM

## 2024-05-24 ASSESSMENT — LOCATION ZONE DERM: LOCATION ZONE: TRUNK

## 2024-05-24 ASSESSMENT — LOCATION SIMPLE DESCRIPTION DERM: LOCATION SIMPLE: CHEST

## 2024-05-24 NOTE — PROCEDURE: SUTURE REMOVAL (GLOBAL PERIOD)
Detail Level: Detailed
Add 28304 Cpt? (Important Note: In 2017 The Use Of 12039 Is Being Tracked By Cms To Determine Future Global Period Reimbursement For Global Periods): no

## 2024-06-11 ENCOUNTER — OFFICE (OUTPATIENT)
Dept: URBAN - METROPOLITAN AREA CLINIC 72 | Facility: CLINIC | Age: 69
End: 2024-06-11
Payer: COMMERCIAL

## 2024-06-11 VITALS
HEIGHT: 59 IN | WEIGHT: 110 LBS | HEART RATE: 72 BPM | OXYGEN SATURATION: 100 % | DIASTOLIC BLOOD PRESSURE: 70 MMHG | SYSTOLIC BLOOD PRESSURE: 122 MMHG

## 2024-06-11 DIAGNOSIS — Z90.49 ACQUIRED ABSENCE OF OTHER SPECIFIED PARTS OF DIGESTIVE TRACT: ICD-10-CM

## 2024-06-11 DIAGNOSIS — C44.92 SQUAMOUS CELL CARCINOMA OF SKIN, UNSPECIFIED: ICD-10-CM

## 2024-06-11 DIAGNOSIS — K50.019 CROHN'S DISEASE OF SMALL INTESTINE WITH UNSPECIFIED COMPLICA: ICD-10-CM

## 2024-06-11 DIAGNOSIS — Z85.89 PERSONAL HISTORY OF MALIGNANT NEOPLASM OF OTHER ORGANS AND S: ICD-10-CM

## 2024-06-11 PROCEDURE — 99214 OFFICE O/P EST MOD 30 MIN: CPT | Performed by: INTERNAL MEDICINE

## 2024-06-11 RX ORDER — SODIUM SULFATE, POTASSIUM SULFATE, MAGNESIUM SULFATE 1.6; 3.13; 17.5 G/ML; G/ML; G/ML
SOLUTION, CONCENTRATE ORAL
Qty: 1 | Refills: 0 | Status: ACTIVE
Start: 2024-06-11

## 2024-06-11 NOTE — SERVICEHPINOTES
Denisa Daily   is seen today for a follow-up visit.  
br
br68 year old female with small crohn's disease since her 20's, remote smoking (none since 1977) status post ileocecal resection and SB surgery with about 68 cm of SB removed. she also has osteoporosisShe was seen by Dr. Gutierrez in 1/2017 per that visitbr"She reports that she was diagnosed with Crohn's ileocolitis at age 19 and in reviewing some of the records that she brings with her today, she has had multiple surgeries with ~68cm of resected small bowel. She reports that her last surgery was a little over 10yrs ago.Lanette most recent colonoscope was done in 6/2015 (while living in [place]) and was remarkable for an ileo-colonic anastomosis in the ascending colon which was patent - however, the anastomosis and her caleb-TI contained a few patchy non-bleeding erosions.brPer her report, she has only been on 5-ASA medications - previously Pentasa but currently Sulfasalazine. On this regimen, she has been having 1-3 formed BMs/day without any GI tract bleeding symptoms or abdominal pain/cramping. She reports that her weight has been stable and her appetite has been good. She also reports that she has been diagnosed with osteoporosis and has been on Prolia but could not tolerate it - she is going to have a follow-up DEXA scan through 's office in April."She did not follow up on Dr. Gutierrez's recommendation for work up and treatment.She has been seeing Dr. Meneses in [facility]. Her last surgery was 1994. She flared around 2012. She has tried to stay off biologics. She had another flare in June and was on prednisone since June to Aug. When she flares she gets bad abdominal pain and obstructive symptoms. Her last colonoscopy was at least 4-5 years ago.She is currently on pentasa 6 pills a day. She is also on medicine for GERD (prevacid and pepcid) and has had a fundoplicationCurrently she has a BM 2-3 BM in the AM soft to water. No blood. Only mild pain after eating.Interval History:11/18/2021She has been flaring, having some RLQ pain and diarrhea. Sometimes she gets it into the back and into the perineum. She is watching her diet.brShe hasn't seen any blood in the stool.Riana is taking pentasa 6 pills a day. when she tried to cut down dose she felt like she got worse.brInterval History:3/3/2022bAndrae received initial dose of entyvio 12/23brShe has had 3 doses. she is now on the 8 week maintenance.Riana is starting to feel a little better.Riana is off budesonide.Riana is still taking pentasa 6 pills ad ay.Riana is still having some right pelvic pain that she feels like is improving.Riana is still having loose BM, diarrhea is not as often. It was initially 4-5 times a day and now twice a dayInterval History:5/23/2022brvit D and b12 were normalbrFC was 130brI asked her to remain on pentasa and start xifaxanbrHer last infusion was about 8 weeks ago. She felt like that helped her back and perineal pain is better.Riana has had 4 URI infections on entyvio but she has also had lots of exposures [facility] and kids are in .Riana was seen in urgent care and all covid, strep, flu test were negative. She is also having some diarrhea and pain.Riana did not notice any difference with the xifaxan. she is taking pentasa 6 pills a day. In the last couple days she has had up to 10 BM a day. No blood in the stool.Interval History:7/28/2022br5/22 C diff negbr5/22 CTE with active disease in the neoTIbr7/22 colonoscopy with ileocolonic anastomosis and multiple erosions/ulcers on the ileal side and at anastomoses. THe colonic mucosa was normalbrLast entyvio infusion was 5/22brHer pain has been a little better. She is still having diarrhea. 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 She goes once a year for checks and have things removed from time to time.Riana is taking the pentasa 6 pills a day.Interval History:11/28/2022brIn September she had the infusions and she felt better right away.Riana finished the entocort. She is still on 6 pills a day of pentasa.br11/9 she had her first injection. She was a week late and was starting to have a little diarrhea.Riana felt that it did help.brStarting less then a week ago she had some aching in her rectum. She has had a little diarrhea as well.Riana tried some Aquaphor a little.Riana is not noticing URI symptoms on stelara (was having a lot of that on entyvio)Interval History:4/3/2023brMRE and CT with disease activity in the colon and small bowelbrI started budesonide mid januarybrcolonoscopy with disease at the neoTI anastomosis but non once I entered caleb TI. there was a possible stricture.brFC was 230.Riana had some dysphagia and tightness at GE junction was dilated to 18.Riana went down to 4 pills a day of pentasa.Riana is on 2 budesonidebrShe decreased coffee and decreased winebrShe had the IV and 2 injections of the stelaraShe went to the ER 12 days after taking the 2nd stelara injection. Because the imaging saw lots of inflammation, she therefor stopped the stellara.In terms of the gastritis/GERD she is on lansoprazole 30 and famotidine 40. She has tried nexium, protonix, prilosec and they didn't help. 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 She really didn't have diarrhea till they started her on antibiotics.brThe diarrhea got better when they stopped the antibiotics. NOw she is having diarrhea but it is not as bad and controlled with 2 Imodium a day.Riana is due to follow up with Dr. Lou in 3 weeks. She will remain on ganciclovir during that time.Riana is slowly getting her energy back. She is having a little discomfort with taking a deep breath but that is improving with exercise and flonaseInterval History:8/8/2023brI personally spoke to Dr. Lou and she is concerned about skirizi treatment but unclear if anti-TNF would be safer and other meds have not been effective. will plan for prophylaxis.Riana is feeling a little tiredbrShe has a squamous cell lesion on the left vulva that is being removed next month. She is seeing Dr. Bruno gonzales.brBowel movements are a little loose, sometimes liquid. She felt like her crohns pain was coming back and then she got skirizi and things are feeling better. She is taking immodiumbrHer acid reflux has been bothering her. She has epigastric pain and belching. She feels like the fundoplication is not working as before.Riana is taking lasoprazole 30 mg twice a day,Riana is going to do a virtual visit on 8/29 and then Dr. Rushing on 8/30 at [facility].Riana is on valganciclovir 2 pill a day and seems to be doing OK with that.brInterval History:9/15/2023brI reviewed note from [facility] and they felt that a severe R2 recurrence would be needed to change off skirizi. ALso suggested that her disease was perhaps not enough to change off entyvio (symptomatically she was not doing well though).Manoj Monreal did not feel that she needed the valcyte for prophylaxes and she spoke to Dr. Lou who agreed.Per [facility], no change unless severe raking ulcers seen with >R2 reoccurence, FC goal &lt250, get colonoscopy 6 months after restarting skirizi to eval stricture and get CTE MRE If there is a question to eval for upstream dilation/Antony has had 2 induction infusions and is due for final on 9/28 Would be due for colonosocpy 2/2024The RLQ pain and right flank pain is much better.brLower abdominal cramping with diarrhea is improved.Adin has some upper epigastric pain that is there most days but is better. It is sometimes worse after eating. It is a aching pain.Riana has some diarrhea but not as much and she had a normal BM this AM. No blood in the stool. No fevers, she still feels fatigued.Riana also has insomnia. She has not really had that before.Riana has been on lansoprazole for 20 years. She was having acid reflux.Riana is having some intermittent dysphagia with solids and pills.Riana takes the sucralfate only once in a while.Interval Hx 11/15/23:Riana was admitted to [facility] 11/3/23 through 11/8/23 with a small bowel obstruction. She's a history of Crohn's disease (on Skyrizi), history of multiple small bowel resection and GERD. GI and colorectal surgery were consulted and patient's symptoms were managed with supportive care and started on budesonide with improvement. CMV PCR negative. B12, vitamin D and iron studies normal. TB quant Gold, urine histoplasmosis in process. Electrolytes normal. She improved and was discharged 11/18/23 to follow up with GI. Today, she reports feeling better. She is on budesonide 9 mg daily need to taper. She seeing [facility] surgery Dr. Palacios January 12. She's been doing a low residue/purée-type diet. She's taking Valganciclovir BID and is wondering if she can stop or if she should continue while on the budesonide.11/4/23-CT findings suspicious for at least partial small bowel obstruction, with transition an area of terminal ileal wall thickening and luminal narrowing, suspicious for a stricture. Mild gastric antral wall thickening and mucosal hyper enhancement. Nondistended gallbladder. Small to moderate hiatal hernia.br11/4/23 x-ray abdomen-dilated loops of small bowel up to 3.3 cm, with nonvisualizationof numerous loops of fluid-filled and dilated small bowel. Findings concerning for possible partial obstruction vsbrInterval History: 1/16/2024Sheffie had colonoscopy with stricture dilated and R2 diseasebrthey recommended increase skirizi to every 4 weeks or change to rinvoq.Riana is feeling better after the dilation.brThings are moving through better. She is careful with her diet.brAfter she weaned off the budesonide in mid to late december she started to flare and now on Budesonide 3 mg once a day.brPlan is to try skirizi every 4 weeks but hasn't heard from insurance.brNo fevers, this is wearing her down.Riana sees the surgeon this Friday.riana is not currently taking valcytebrshe is on zinc, vit D, calcium, magnesiu, b12, mvi, vitamin CbrShe is having a BM daily, regular, no significant diarrhea on the budesonide. The pain is improved on budesonide as well.Lanette last skirizi was 12/21brPlan from last visit:brchange skirizi to every 4 weeksInterval History:3/18/2024bAndrae saw Dr. Palacios and decided on surgery and she had it on 3/6. He removed amost 2 feet of small bowel. Colleen also did a liver biopsy because he said it looked disease. Lanette acid reflux flared up a lot after the surgery. There is heartburn and epigastric pain. Still some soreness from surgery. Lanette appetite is fair and she is still losing some weight but she is eating more Riana also notes some diarrhea last week but spoke to Dr. Minaya' office and they recommended more fiber so she has started that. She was also wanting my opinion on probiotics for gut health.     br  Plan from last visit:
br- labs in early May (CBC, CMP, b12, folate, iron profile, ferritin, vitamin D, INR)br- sucralfate can be used up to 4 times a daybr- please ask IBD group to send me notes, operative report and path. We are trying to get them as well. br- start a daily multivitamin if you are not already taking one, continue other supplements for now br- You can take a probiotic for 1-2 months. I like natures bounty 10br- follow up in 3 months or sooner if needed. Interval History:  6/11/2024  br4/10/2024 - Referring Physician Office Note [facility] - recommend starting skirizi ASA every 4 weeks. high risk for recurrence, needs OK from surgeon regarding vulvar resection. 
br
br SHe had vulvar resection.  she had 2 residual areas with squamous cells and she had lazer surgery for that.  Also one on her chest.  br
br She is still battleing some styles on her eyes, these have not been going away and roseacea since the bowel surgery. 
br
br We are three months out from surgery.  
br
br BM are normal.  br
br She is taking multivitamin, Calcium, magnesium, vitamin D, zince, b12.  brMy nurse has reviewed and updated the medication list with the patient (medication reconciliation). I have also reviewed the medication list. New updates were made to the patient's medical, social and family history. Pertinent details are also noted above in the HPI.br visited="true"

## 2024-06-11 NOTE — SERVICENOTES
Our goal is to partner with you to improve your health and well being. It is important for you to complete necessary testing and follow the instructions given to you at your clinic visit. Our office will call you within 2 weeks with results of any testing but you may also call sooner to obtain results [phone].   If you have any questions or concerns please feel free to call.  We take your care very seriously and we thank you for your trust!
- colonoscopy in mid july or august.
- follow up after procedure (1 week later) in office or

## 2024-06-21 ENCOUNTER — APPOINTMENT (OUTPATIENT)
Dept: URBAN - METROPOLITAN AREA SURGERY 12 | Age: 69
Setting detail: DERMATOLOGY
End: 2024-06-21

## 2024-06-21 DIAGNOSIS — L82.1 OTHER SEBORRHEIC KERATOSIS: ICD-10-CM

## 2024-06-21 DIAGNOSIS — L57.0 ACTINIC KERATOSIS: ICD-10-CM

## 2024-06-21 PROBLEM — D48.5 NEOPLASM OF UNCERTAIN BEHAVIOR OF SKIN: Status: ACTIVE | Noted: 2024-06-21

## 2024-06-21 PROCEDURE — OTHER BIOPSY BY SHAVE METHOD: OTHER

## 2024-06-21 PROCEDURE — 17000 DESTRUCT PREMALG LESION: CPT | Mod: 59

## 2024-06-21 PROCEDURE — 99212 OFFICE O/P EST SF 10 MIN: CPT | Mod: 25

## 2024-06-21 PROCEDURE — OTHER LIQUID NITROGEN: OTHER

## 2024-06-21 PROCEDURE — 11102 TANGNTL BX SKIN SINGLE LES: CPT

## 2024-06-21 PROCEDURE — OTHER COUNSELING: OTHER

## 2024-06-21 PROCEDURE — 11103 TANGNTL BX SKIN EA SEP/ADDL: CPT

## 2024-06-21 ASSESSMENT — LOCATION DETAILED DESCRIPTION DERM
LOCATION DETAILED: LEFT ANTERIOR DISTAL THIGH
LOCATION DETAILED: LEFT PROXIMAL PRETIBIAL REGION

## 2024-06-21 ASSESSMENT — LOCATION SIMPLE DESCRIPTION DERM
LOCATION SIMPLE: LEFT THIGH
LOCATION SIMPLE: LEFT PRETIBIAL REGION

## 2024-06-21 ASSESSMENT — LOCATION ZONE DERM: LOCATION ZONE: LEG

## 2024-06-21 NOTE — PROCEDURE: BIOPSY BY SHAVE METHOD
Body Location Override (Optional - Billing Will Still Be Based On Selected Body Map Location If Applicable): right superior lateral cheek
Detail Level: Detailed
Depth Of Biopsy: dermis
Was A Bandage Applied: Yes
Size Of Lesion In Cm: 0
Biopsy Type: H and E
Biopsy Method: Dermablade
Anesthesia Type: 1% lidocaine with epinephrine
Anesthesia Volume In Cc: 0.5
Hemostasis: Drysol
Wound Care: Petrolatum
Dressing: bandage
Destruction After The Procedure: No
Type Of Destruction Used: Curettage
Curettage Text: The wound bed was treated with curettage after the biopsy was performed.
Cryotherapy Text: The wound bed was treated with cryotherapy after the biopsy was performed.
Electrodesiccation Text: The wound bed was treated with electrodesiccation after the biopsy was performed.
Electrodesiccation And Curettage Text: The wound bed was treated with electrodesiccation and curettage after the biopsy was performed.
Silver Nitrate Text: The wound bed was treated with silver nitrate after the biopsy was performed.
Lab: [id number]
Lab Facility: [id number]
Consent: Written consent was obtained and risks were reviewed including but not limited to scarring, infection, bleeding, scabbing, incomplete removal, nerve damage and allergy to anesthesia.
Post-Care Instructions: I reviewed with the patient in detail post-care instructions. Patient is to keep the biopsy site dry overnight, and then apply bacitracin twice daily until healed. Patient may apply hydrogen peroxide soaks to remove any crusting.
Notification Instructions: Patient will be notified of biopsy results. However, patient instructed to call the office if not contacted within 2 weeks.
Billing Type: Third-Party Bill
Information: Selecting Yes will display possible errors in your note based on the variables you have selected. This validation is only offered as a suggestion for you. PLEASE NOTE THAT THE VALIDATION TEXT WILL BE REMOVED WHEN YOU FINALIZE YOUR NOTE. IF YOU WANT TO FAX A PRELIMINARY NOTE YOU WILL NEED TO TOGGLE THIS TO 'NO' IF YOU DO NOT WANT IT IN YOUR FAXED NOTE.
Body Location Override (Optional - Billing Will Still Be Based On Selected Body Map Location If Applicable): right preauricular
Body Location Override (Optional - Billing Will Still Be Based On Selected Body Map Location If Applicable): right inferior cheek
Path Notes (To The Dermatopathologist): Biopsies all from same lesion

## 2024-07-12 ENCOUNTER — APPOINTMENT (OUTPATIENT)
Dept: URBAN - METROPOLITAN AREA SURGERY 12 | Age: 69
Setting detail: DERMATOLOGY
End: 2024-07-12

## 2024-07-12 DIAGNOSIS — L70.8 OTHER ACNE: ICD-10-CM

## 2024-07-12 DIAGNOSIS — L57.0 ACTINIC KERATOSIS: ICD-10-CM

## 2024-07-12 DIAGNOSIS — D18.0 HEMANGIOMA: ICD-10-CM

## 2024-07-12 PROBLEM — D18.01 HEMANGIOMA OF SKIN AND SUBCUTANEOUS TISSUE: Status: ACTIVE | Noted: 2024-07-12

## 2024-07-12 PROCEDURE — OTHER LIQUID NITROGEN: OTHER

## 2024-07-12 PROCEDURE — 17000 DESTRUCT PREMALG LESION: CPT

## 2024-07-12 PROCEDURE — OTHER COUNSELING: OTHER

## 2024-07-12 PROCEDURE — 99212 OFFICE O/P EST SF 10 MIN: CPT | Mod: 25

## 2024-07-12 PROCEDURE — OTHER ADDITIONAL NOTES: OTHER

## 2024-07-12 PROCEDURE — 17003 DESTRUCT PREMALG LES 2-14: CPT

## 2024-07-12 ASSESSMENT — LOCATION SIMPLE DESCRIPTION DERM
LOCATION SIMPLE: LEFT FOREHEAD
LOCATION SIMPLE: RIGHT ZYGOMA
LOCATION SIMPLE: LEFT EYEBROW
LOCATION SIMPLE: LEFT UPPER BACK
LOCATION SIMPLE: RIGHT CHEEK
LOCATION SIMPLE: LEFT FOREARM
LOCATION SIMPLE: LEFT UPPER ARM
LOCATION SIMPLE: RIGHT NOSE
LOCATION SIMPLE: LEFT ANTERIOR NECK
LOCATION SIMPLE: UPPER BACK

## 2024-07-12 ASSESSMENT — LOCATION DETAILED DESCRIPTION DERM
LOCATION DETAILED: RIGHT LATERAL ZYGOMA
LOCATION DETAILED: RIGHT INFERIOR NASAL CHEEK
LOCATION DETAILED: LEFT FOREHEAD
LOCATION DETAILED: LEFT LATERAL PROXIMAL UPPER ARM
LOCATION DETAILED: RIGHT NASAL SIDEWALL
LOCATION DETAILED: LEFT LATERAL EYEBROW
LOCATION DETAILED: SUPERIOR THORACIC SPINE
LOCATION DETAILED: LEFT MID-UPPER BACK
LOCATION DETAILED: LEFT VENTRAL PROXIMAL FOREARM
LOCATION DETAILED: LEFT INFERIOR ANTERIOR NECK

## 2024-07-12 ASSESSMENT — LOCATION ZONE DERM
LOCATION ZONE: ARM
LOCATION ZONE: FACE
LOCATION ZONE: TRUNK
LOCATION ZONE: NECK
LOCATION ZONE: NOSE

## 2024-07-12 NOTE — PROCEDURE: ADDITIONAL NOTES
Additional Notes: LN2 to bx proven Ak at right superior lateral cheek
Detail Level: Simple
Render Risk Assessment In Note?: no

## 2024-07-12 NOTE — PROCEDURE: LIQUID NITROGEN
Render Post-Care Instructions In Note?: no
Number Of Freeze-Thaw Cycles: 1 freeze-thaw cycle
Show Applicator Variable?: Yes
Consent: The patient's consent was obtained including but not limited to risks of crusting, scabbing, blistering, scarring, darker or lighter pigmentary change, recurrence, incomplete removal and infection.
Detail Level: Detailed
Application Tool (Optional): Liquid Nitrogen Sprayer
Duration Of Freeze Thaw-Cycle (Seconds): 10
Post-Care Instructions: I reviewed with the patient in detail post-care instructions. Patient is to wear sunprotection, and avoid picking at any of the treated lesions. Pt may apply Vaseline to crusted or scabbing areas.

## 2024-08-05 ENCOUNTER — AMBULATORY SURGICAL CENTER (OUTPATIENT)
Dept: URBAN - METROPOLITAN AREA SURGERY 19 | Facility: SURGERY | Age: 69
End: 2024-08-05
Payer: MEDICARE

## 2024-08-05 ENCOUNTER — OFFICE (OUTPATIENT)
Dept: URBAN - METROPOLITAN AREA PATHOLOGY 10 | Facility: PATHOLOGY | Age: 69
End: 2024-08-05
Payer: MEDICARE

## 2024-08-05 DIAGNOSIS — K50.00 CROHN'S DISEASE OF SMALL INTESTINE WITHOUT COMPLICATIONS: ICD-10-CM

## 2024-08-05 LAB
COLONOSCOPY STUDY: (no result)
COLONOSCOPY STUDY: (no result)

## 2024-08-05 PROCEDURE — 45380 COLONOSCOPY AND BIOPSY: CPT | Performed by: INTERNAL MEDICINE

## 2024-08-05 PROCEDURE — 88305 TISSUE EXAM BY PATHOLOGIST: CPT | Mod: TC | Performed by: STUDENT IN AN ORGANIZED HEALTH CARE EDUCATION/TRAINING PROGRAM

## 2024-10-17 ENCOUNTER — APPOINTMENT (OUTPATIENT)
Dept: URBAN - METROPOLITAN AREA SURGERY 12 | Age: 69
Setting detail: DERMATOLOGY
End: 2024-10-18

## 2024-10-17 DIAGNOSIS — L81.4 OTHER MELANIN HYPERPIGMENTATION: ICD-10-CM

## 2024-10-17 DIAGNOSIS — D18.0 HEMANGIOMA: ICD-10-CM

## 2024-10-17 DIAGNOSIS — L57.0 ACTINIC KERATOSIS: ICD-10-CM

## 2024-10-17 DIAGNOSIS — L57.8 OTHER SKIN CHANGES DUE TO CHRONIC EXPOSURE TO NONIONIZING RADIATION: ICD-10-CM

## 2024-10-17 DIAGNOSIS — Z85.828 PERSONAL HISTORY OF OTHER MALIGNANT NEOPLASM OF SKIN: ICD-10-CM

## 2024-10-17 DIAGNOSIS — L82.1 OTHER SEBORRHEIC KERATOSIS: ICD-10-CM

## 2024-10-17 DIAGNOSIS — D22 MELANOCYTIC NEVI: ICD-10-CM

## 2024-10-17 DIAGNOSIS — L82.0 INFLAMED SEBORRHEIC KERATOSIS: ICD-10-CM

## 2024-10-17 PROBLEM — D48.5 NEOPLASM OF UNCERTAIN BEHAVIOR OF SKIN: Status: ACTIVE | Noted: 2024-10-17

## 2024-10-17 PROBLEM — D22.5 MELANOCYTIC NEVI OF TRUNK: Status: ACTIVE | Noted: 2024-10-17

## 2024-10-17 PROBLEM — D18.01 HEMANGIOMA OF SKIN AND SUBCUTANEOUS TISSUE: Status: ACTIVE | Noted: 2024-10-17

## 2024-10-17 PROCEDURE — 17110 DESTRUCT B9 LESION 1-14: CPT

## 2024-10-17 PROCEDURE — 11103 TANGNTL BX SKIN EA SEP/ADDL: CPT | Mod: 59

## 2024-10-17 PROCEDURE — OTHER SUNSCREEN RECOMMENDATIONS: OTHER

## 2024-10-17 PROCEDURE — OTHER BIOPSY BY SHAVE METHOD: OTHER

## 2024-10-17 PROCEDURE — 17000 DESTRUCT PREMALG LESION: CPT | Mod: 59

## 2024-10-17 PROCEDURE — OTHER MIPS QUALITY: OTHER

## 2024-10-17 PROCEDURE — OTHER REASSURANCE: OTHER

## 2024-10-17 PROCEDURE — 11102 TANGNTL BX SKIN SINGLE LES: CPT | Mod: 59

## 2024-10-17 PROCEDURE — OTHER LIQUID NITROGEN: OTHER

## 2024-10-17 PROCEDURE — OTHER COUNSELING: OTHER

## 2024-10-17 PROCEDURE — 99213 OFFICE O/P EST LOW 20 MIN: CPT | Mod: 25

## 2024-10-17 ASSESSMENT — LOCATION DETAILED DESCRIPTION DERM
LOCATION DETAILED: LEFT SUPERIOR UPPER BACK
LOCATION DETAILED: RIGHT SUPERIOR MEDIAL UPPER BACK
LOCATION DETAILED: RIGHT ANTERIOR PROXIMAL UPPER ARM
LOCATION DETAILED: RIGHT DISTAL DORSAL FOREARM
LOCATION DETAILED: RIGHT LATERAL BUCCAL CHEEK
LOCATION DETAILED: RIGHT SUPERIOR UPPER BACK
LOCATION DETAILED: LEFT MID-UPPER BACK

## 2024-10-17 ASSESSMENT — LOCATION ZONE DERM
LOCATION ZONE: ARM
LOCATION ZONE: TRUNK
LOCATION ZONE: FACE

## 2024-10-17 ASSESSMENT — LOCATION SIMPLE DESCRIPTION DERM
LOCATION SIMPLE: RIGHT UPPER BACK
LOCATION SIMPLE: LEFT UPPER BACK
LOCATION SIMPLE: RIGHT UPPER ARM
LOCATION SIMPLE: RIGHT FOREARM
LOCATION SIMPLE: RIGHT CHEEK

## 2024-10-17 NOTE — PROCEDURE: LIQUID NITROGEN
Medical Necessity Information: It is in your best interest to select a reason for this procedure from the list below. All of these items fulfill various CMS LCD requirements except the new and changing color options.
Render Note In Bullet Format When Appropriate: No
Show Aperture Variable?: Yes
Detail Level: Detailed
Consent: The patient's consent was obtained including but not limited to risks of crusting, scabbing, blistering, scarring, darker or lighter pigmentary change, recurrence, incomplete removal and infection.
Post-Care Instructions: I reviewed with the patient in detail post-care instructions. Patient is to wear sunprotection, and avoid picking at any of the treated lesions. Pt may apply Vaseline to crusted or scabbing areas.
Medical Necessity Clause: This procedure was medically necessary because the lesions that were treated were:
Spray Paint Text: The liquid nitrogen was applied to the skin utilizing a spray paint frosting technique.
Number Of Freeze-Thaw Cycles: 1 freeze-thaw cycle
Duration Of Freeze Thaw-Cycle (Seconds): 5

## 2024-10-17 NOTE — PROCEDURE: BIOPSY BY SHAVE METHOD
Detail Level: Detailed
Depth Of Biopsy: dermis
Was A Bandage Applied: Yes
Size Of Lesion In Cm: 0
Biopsy Type: H and E
Biopsy Method: Dermablade
Anesthesia Type: 1% lidocaine with epinephrine
Anesthesia Volume In Cc: 0.5
Hemostasis: Drysol
Wound Care: Petrolatum
Dressing: bandage
Destruction After The Procedure: No
Type Of Destruction Used: Curettage
Curettage Text: The wound bed was treated with curettage after the biopsy was performed.
Cryotherapy Text: The wound bed was treated with cryotherapy after the biopsy was performed.
Electrodesiccation Text: The wound bed was treated with electrodesiccation after the biopsy was performed.
Electrodesiccation And Curettage Text: The wound bed was treated with electrodesiccation and curettage after the biopsy was performed.
Silver Nitrate Text: The wound bed was treated with silver nitrate after the biopsy was performed.
Lab: [id number]
Lab Facility: [id number]
Consent: Written consent was obtained and risks were reviewed including but not limited to scarring, infection, bleeding, scabbing, incomplete removal, nerve damage and allergy to anesthesia.
Post-Care Instructions: I reviewed with the patient in detail post-care instructions. Patient is to keep the biopsy site dry overnight, and then apply bacitracin twice daily until healed. Patient may apply hydrogen peroxide soaks to remove any crusting.
Notification Instructions: Patient will be notified of biopsy results. However, patient instructed to call the office if not contacted within 2 weeks.
Billing Type: Third-Party Bill
Information: Selecting Yes will display possible errors in your note based on the variables you have selected. This validation is only offered as a suggestion for you. PLEASE NOTE THAT THE VALIDATION TEXT WILL BE REMOVED WHEN YOU FINALIZE YOUR NOTE. IF YOU WANT TO FAX A PRELIMINARY NOTE YOU WILL NEED TO TOGGLE THIS TO 'NO' IF YOU DO NOT WANT IT IN YOUR FAXED NOTE.

## 2024-11-06 ENCOUNTER — APPOINTMENT (OUTPATIENT)
Dept: URBAN - METROPOLITAN AREA SURGERY 12 | Age: 69
Setting detail: DERMATOLOGY
End: 2024-11-06

## 2024-11-06 DIAGNOSIS — L57.0 ACTINIC KERATOSIS: ICD-10-CM

## 2024-11-06 PROCEDURE — OTHER LIQUID NITROGEN: OTHER

## 2024-11-06 PROCEDURE — 17000 DESTRUCT PREMALG LESION: CPT

## 2024-11-06 PROCEDURE — OTHER ADDITIONAL NOTES: OTHER

## 2024-11-06 ASSESSMENT — LOCATION SIMPLE DESCRIPTION DERM: LOCATION SIMPLE: RIGHT LOWER BACK

## 2024-11-06 ASSESSMENT — LOCATION ZONE DERM: LOCATION ZONE: TRUNK

## 2024-11-06 ASSESSMENT — LOCATION DETAILED DESCRIPTION DERM: LOCATION DETAILED: RIGHT SUPERIOR MEDIAL MIDBACK

## 2024-11-06 NOTE — PROCEDURE: LIQUID NITROGEN
Detail Level: Detailed
Show Applicator Variable?: Yes
Duration Of Freeze Thaw-Cycle (Seconds): 5
Number Of Freeze-Thaw Cycles: 1 freeze-thaw cycle
Render Note In Bullet Format When Appropriate: No
Post-Care Instructions: I reviewed with the patient in detail post-care instructions. Patient is to wear sunprotection, and avoid picking at any of the treated lesions. Pt may apply Vaseline to crusted or scabbing areas.
Consent: The patient's consent was obtained including but not limited to risks of crusting, scabbing, blistering, scarring, darker or lighter pigmentary change, recurrence, incomplete removal and infection.

## 2024-11-06 NOTE — PROCEDURE: ADDITIONAL NOTES
Additional Notes: Path proven AK on the right superior medial mid back  was treated with LN2 today
Render Risk Assessment In Note?: no
Detail Level: Simple

## 2024-11-30 ENCOUNTER — OFFICE (OUTPATIENT)
Dept: URBAN - METROPOLITAN AREA CLINIC 67 | Facility: CLINIC | Age: 69
End: 2024-11-30
Payer: COMMERCIAL

## 2024-11-30 DIAGNOSIS — K50.019 CROHN'S DISEASE OF SMALL INTESTINE WITH UNSPECIFIED COMPLICA: ICD-10-CM

## 2024-11-30 PROCEDURE — 99426 PRIN CARE MGMT STAFF 1ST 30: CPT | Performed by: INTERNAL MEDICINE

## 2024-12-31 ENCOUNTER — OFFICE (OUTPATIENT)
Dept: URBAN - METROPOLITAN AREA CLINIC 67 | Facility: CLINIC | Age: 69
End: 2024-12-31
Payer: MEDICARE

## 2024-12-31 DIAGNOSIS — K50.019 CROHN'S DISEASE OF SMALL INTESTINE WITH UNSPECIFIED COMPLICA: ICD-10-CM

## 2024-12-31 PROCEDURE — 99426 PRIN CARE MGMT STAFF 1ST 30: CPT | Performed by: INTERNAL MEDICINE

## 2025-01-02 ENCOUNTER — OFFICE (OUTPATIENT)
Dept: URBAN - METROPOLITAN AREA CLINIC 67 | Facility: CLINIC | Age: 70
End: 2025-01-02
Payer: MEDICARE

## 2025-01-02 DIAGNOSIS — K50.012 CROHN'S DISEASE OF SMALL INTESTINE WITH INTESTINAL OBSTRUCTI: ICD-10-CM

## 2025-01-02 PROCEDURE — 96413 CHEMO IV INFUSION 1 HR: CPT | Performed by: INTERNAL MEDICINE

## 2025-02-05 ENCOUNTER — OFFICE (OUTPATIENT)
Dept: URBAN - METROPOLITAN AREA CLINIC 72 | Facility: CLINIC | Age: 70
End: 2025-02-05
Payer: MEDICARE

## 2025-02-05 VITALS
SYSTOLIC BLOOD PRESSURE: 116 MMHG | HEART RATE: 71 BPM | WEIGHT: 122 LBS | DIASTOLIC BLOOD PRESSURE: 70 MMHG | HEIGHT: 59 IN

## 2025-02-05 DIAGNOSIS — R11.0 NAUSEA: ICD-10-CM

## 2025-02-05 DIAGNOSIS — Z85.89 PERSONAL HISTORY OF MALIGNANT NEOPLASM OF OTHER ORGANS AND S: ICD-10-CM

## 2025-02-05 DIAGNOSIS — K82.8 OTHER SPECIFIED DISEASES OF GALLBLADDER: ICD-10-CM

## 2025-02-05 DIAGNOSIS — E55.9 VITAMIN D DEFICIENCY, UNSPECIFIED: ICD-10-CM

## 2025-02-05 DIAGNOSIS — R10.11 RIGHT UPPER QUADRANT PAIN: ICD-10-CM

## 2025-02-05 DIAGNOSIS — K50.818 CROHN'S DISEASE OF BOTH SMALL AND LARGE INTESTINE WITH OTHER: ICD-10-CM

## 2025-02-05 DIAGNOSIS — K80.50 CALCULUS OF BILE DUCT WITHOUT CHOLANGITIS OR CHOLECYSTITIS W: ICD-10-CM

## 2025-02-05 PROCEDURE — 99215 OFFICE O/P EST HI 40 MIN: CPT | Performed by: INTERNAL MEDICINE

## 2025-02-05 RX ORDER — RISANKIZUMAB-RZAA 150 MG/ML
INJECTION SUBCUTANEOUS
Qty: 1 | Refills: 6 | Status: COMPLETED
Start: 2025-02-05 | End: 2025-02-06

## 2025-02-05 NOTE — SERVICENOTES
Our goal is to partner with you to improve your health and well being. It is important for you to complete necessary testing and follow the instructions given to you at your clinic visit. Our office will call you within 2 weeks with results of any testing but you may also call sooner to obtain results [phone].   If you have any questions or concerns please feel free to call.  We take your care very seriously and we thank you for your trust!
- schedule US and HIDA, if US shows gallstones then we can cancel HIDA.  Schedule appointment with DR. Shah after the HIDA so he will have all the results
- labs today
- follow up in July and we will plan on colonoscopy at end of summer/early fall
- I would like to switch to Skyrizi 360 for next dose.  I put in the order.

## 2025-02-05 NOTE — SERVICEHPINOTES
Denisa Daily   is seen today for a follow-up visit.  
69 year old female with small crohn's disease since her 20's, remote smoking (none since 1977) status post ileocecal resection and SB surgery with about 68 cm of SB removed. she also has osteoporosisShe was seen by Dr. Gutierrez in 1/2017 per that visitbr"She reports that she was diagnosed with Crohn's ileocolitis at age 19 and in reviewing some of the records that she brings with her today, she has had multiple surgeries with ~68cm of resected small bowel. She reports that her last surgery was a little over 10yrs ago.brHer most recent colonoscope was done in 6/2015 (while living in [place]) and was remarkable for an ileo-colonic anastomosis in the ascending colon which was patent - however, the anastomosis and her caleb-TI contained a few patchy non-bleeding erosions.brPer her report, she has only been on 5-ASA medications - previously Pentasa but currently Sulfasalazine. On this regimen, she has been having 1-3 formed BMs/day without any GI tract bleeding symptoms or abdominal pain/cramping. She reports that her weight has been stable and her appetite has been good. She also reports that she has been diagnosed with osteoporosis and has been on Prolia but could not tolerate it - she is going to have a follow-up DEXA scan through 's office in April."She did not follow up on Dr. Gutierrez's recommendation for work up and treatment.She has been seeing Dr. Meneses in [facility]. Her last surgery was 1994. She flared around 2012. She has tried to stay off biologics. She had another flare in June and was on prednisone since June to Aug. When she flares she gets bad abdominal pain and obstructive symptoms. Her last colonoscopy was at least 4-5 years ago.She is currently on pentasa 6 pills a day. She is also on medicine for GERD (prevacid and pepcid) and has had a fundoplicationCurrently she has a BM 2-3 BM in the AM soft to water. No blood. Only mild pain after eating.Interval History:11/18/2021She has been flaring, having some RLQ pain and diarrhea. Sometimes she gets it into the back and into the perineum. She is watching her diet.brShe hasn't seen any blood in the stool.Riana is taking pentasa 6 pills a day. when she tried to cut down dose she felt like she got worse.brInterval History:3/3/2022bAndrae received initial dose of entyvio 12/23brShe has had 3 doses. she is now on the 8 week maintenance.Riana is starting to feel a little better.Riana is off budesonide.Riana is still taking pentasa 6 pills ad ay.Riana is still having some right pelvic pain that she feels like is improving.Riana is still having loose BM, diarrhea is not as often. It was initially 4-5 times a day and now twice a dayInterval History:5/23/2022brvit D and b12 were normalbrFC was 130brI asked her to remain on pentasa and start xifaxanbrHer last infusion was about 8 weeks ago. She felt like that helped her back and perineal pain is better.Riana has had 4 URI infections on entyvio but she has also had lots of exposures [facility] and kids are in .Riana was seen in urgent care and all covid, strep, flu test were negative. She is also having some diarrhea and pain.Riana did not notice any difference with the xifaxan. she is taking pentasa 6 pills a day. In the last couple days she has had up to 10 BM a day. No blood in the stool.Interval History:7/28/2022br5/22 C diff negbr5/22 CTE with active disease in the neoTIbr7/22 colonoscopy with ileocolonic anastomosis and multiple erosions/ulcers on the ileal side and at anastomoses. THe colonic mucosa was normalbrLast entyvio infusion was 5/22brHer pain has been a little better. She is still having diarrhea. 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 She goes once a year for checks and have things removed from time to time.Riana is taking the pentasa 6 pills a day.Interval History:11/28/2022brIn September she had the infusions and she felt better right away.Riana finished the entocort. She is still on 6 pills a day of pentasa.br11/9 she had her first injection. She was a week late and was starting to have a little diarrhea.Riana felt that it did help.brStarting less then a week ago she had some aching in her rectum. She has had a little diarrhea as well.Riana tried some Aquaphor a little.Riana is not noticing URI symptoms on stelara (was having a lot of that on entyvio)Interval History:4/3/2023brMRE and CT with disease activity in the colon and small bowelbrI started budesonide mid januarybrcolonoscopy with disease at the neoTI anastomosis but non once I entered caleb TI. there was a possible stricture.brFC was 230.Riana had some dysphagia and tightness at GE junction was dilated to 18.Riana went down to 4 pills a day of pentasa.Riana is on 2 budesonidebrShe decreased coffee and decreased winebrShe had the IV and 2 injections of the stelaraShe went to the ER 12 days after taking the 2nd stelara injection. Because the imaging saw lots of inflammation, she therefor stopped the stellara.In terms of the gastritis/GERD she is on lansoprazole 30 and famotidine 40. She has tried nexium, protonix, prilosec and they didn't help. 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 She really didn't have diarrhea till they started her on antibiotics.brThe diarrhea got better when they stopped the antibiotics. NOw she is having diarrhea but it is not as bad and controlled with 2 Imodium a day.Riana is due to follow up with Dr. Lou in 3 weeks. She will remain on ganciclovir during that time.Riana is slowly getting her energy back. She is having a little discomfort with taking a deep breath but that is improving with exercise and flonaseInterval History:8/8/2023brI personally spoke to Dr. Lou and she is concerned about skirizi treatment but unclear if anti-TNF would be safer and other meds have not been effective. will plan for prophylaxis.Riana is feeling a little tiredbrShe has a squamous cell lesion on the left vulva that is being removed next month. She is seeing Dr. Bruno gonzales.brBowel movements are a little loose, sometimes liquid. She felt like her crohns pain was coming back and then she got skirizi and things are feeling better. She is taking immodiumbrHer acid reflux has been bothering her. She has epigastric pain and belching. She feels like the fundoplication is not working as before.Riana is taking lasoprazole 30 mg twice a day,Riana is going to do a virtual visit on 8/29 and then Dr. Rushing on 8/30 at [facility].Riana is on valganciclovir 2 pill a day and seems to be doing OK with that.brInterval History:9/15/2023brI reviewed note from [facility] and they felt that a severe R2 recurrence would be needed to change off skirizi. ALso suggested that her disease was perhaps not enough to change off entyvio (symptomatically she was not doing well though).Manoj Monreal did not feel that she needed the valcyte for prophylaxes and she spoke to Dr. Lou who agreed.Per [facility], no change unless severe raking ulcers seen with >R2 reoccurence, FC goal &lt250, get colonoscopy 6 months after restarting skirizi to eval stricture and get CTE MRE If there is a question to eval for upstream dilation/Antony has had 2 induction infusions and is due for final on 9/28 Would be due for colonosocpy 2/2024The RLQ pain and right flank pain is much better.brLower abdominal cramping with diarrhea is improved.Adin has some upper epigastric pain that is there most days but is better. It is sometimes worse after eating. It is a aching pain.Riana has some diarrhea but not as much and she had a normal BM this AM. No blood in the stool. No fevers, she still feels fatigued.Riana also has insomnia. She has not really had that before.Riana has been on lansoprazole for 20 years. She was having acid reflux.Riana is having some intermittent dysphagia with solids and pills.Riana takes the sucralfate only once in a while.Interval Hx 11/15/23:Riana was admitted to [facility] 11/3/23 through 11/8/23 with a small bowel obstruction. She's a history of Crohn's disease (on Skyrizi), history of multiple small bowel resection and GERD. GI and colorectal surgery were consulted and patient's symptoms were managed with supportive care and started on budesonide with improvement. CMV PCR negative. B12, vitamin D and iron studies normal. TB quant Gold, urine histoplasmosis in process. Electrolytes normal. She improved and was discharged 11/18/23 to follow up with GI. Today, she reports feeling better. She is on budesonide 9 mg daily need to taper. She seeing [facility] surgery Dr. Palacios January 12. She's been doing a low residue/purée-type diet. She's taking Valganciclovir BID and is wondering if she can stop or if she should continue while on the budesonide.11/4/23-CT findings suspicious for at least partial small bowel obstruction, with transition an area of terminal ileal wall thickening and luminal narrowing, suspicious for a stricture. Mild gastric antral wall thickening and mucosal hyper enhancement. Nondistended gallbladder. Small to moderate hiatal hernia.br11/4/23 x-ray abdomen-dilated loops of small bowel up to 3.3 cm, with nonvisualizationof numerous loops of fluid-filled and dilated small bowel. Findings concerning for possible partial obstruction vsbrInterval History: 1/16/2024She had colonoscopy with stricture dilated and R2 diseasebrthey recommended increase skirizi to every 4 weeks or change to rinvoq.Riana is feeling better after the dilation.brThings are moving through better. She is careful with her diet.brAfter she weaned off the budesonide in mid to late december she started to flare and now on Budesonide 3 mg once a day.brPlan is to try skirizi every 4 weeks but hasn't heard from insurance.brNo fevers, this is wearing her down.Riana sees the surgeon this Friday.riana is not currently taking valcytebrshe is on zinc, vit D, calcium, magnesiu, b12, mvi, vitamin CbrShe is having a BM daily, regular, no significant diarrhea on the budesonide. The pain is improved on budesonide as well.brHer last skirizi was 12/21brPlan from last visit:brchange skirizi to every 4 weeksInterval History:3/18/2024brSdarion saw Dr. Palacios and decided on surgery and she had it on 3/6. He removed amost 2 feet of small bowel.brHe also did a liver biopsy because he said it looked disease.brHer acid reflux flared up a lot after the surgery. There is heartburn and epigastric pain. Still some soreness from surgery.brHer appetite is fair and she is still losing some weight but she is eating morebrSHe also notes some diarrhea last week but spoke to Dr. Minaya' office and they recommended more fiber so she has started that. She was also wanting my opinion on probiotics for gut health.br.Interval History:6/11/2024br4/10/2024 - Referring Physician Office Note [facility] - recommend starting skirizi ASA every 4 weeks. high risk for recurrence, needs OK from surgeon regarding vulvar resection.SHe had vulvar resection. she had 2 residual areas with squamous cells and she had lazer surgery for that. Also one on her chest.She is still battleing some styles on her eyes, these have not been going away and roseacea since the bowel surgery.We are three months out from surgery.BM are normal.She is taking multivitamin, Calcium, magnesium, vitamin D, zince, b12.Plan from last visit:br- colonoscopy in mid july or august.br- follow up after procedure (1 week later) in office or THInterval History:9/26/2024brcolonoscopy with disease reoccurring post surgery in the caleb-TIbrlong discussion with her and her  regarding next steps and decided on remicade. br9/4 she got her first infusion. brWith remicade infusions #2 she got chills, flushing, nausea, abdominal pain, diarrhea and despite stable vital signs and multiple interventions she was not able to complete the infusion due to side effects (this was on 9/18). brShe feels tired and a bit overwhelmed. She is not having diarrhea or abdominal pain. She is having mild symtpoms.    br  Plan from last visit:
br labs in about a weekbr- after labs we will plan on starting skirizi with induction dose. br- you will need close fu with Derm Interval History:  2/5/2025   
br   She completed her skirizi induction 1/2 and had her first skirizi injection (180 mg) already.  
br
brPatient reports starting Stelara injections on January 30th with a dose of 180 mg every 8 weeks. The patient's squamous cell cancers are currently quiescent, with follow-ups scheduled every 6 months.The patient describes gallbladder symptoms beginning with nausea and pain, progressing to right-sided chest and shoulder discomfort. These symptoms are exacerbated by heavier meals, such as mac and cheese. The patient also reports severe acid reflux. Previous imaging revealed a low ejection fraction, no gallstones on ultrasound, and gallbladder distention and wall thickening on CT. A distended gallbladder was noted in November 2023.The patient reports improvement in Crohn's disease symptoms.The patient is currently taking Stelara 180mg injection every 8 weeks (started January 30th) and Skyrizi (dosage not specified). The patient's medical history includes Crohn's disease, squamous cell cancers, and gallbladder issues. My nurse has reviewed and updated the medication list with the patient (medication reconciliation). I have also reviewed the medication list. New updates were made to the patient's medical, social and family history. Pertinent details are also noted above in the HPI.br visited="true"

## 2025-02-28 ENCOUNTER — OFFICE (OUTPATIENT)
Dept: URBAN - METROPOLITAN AREA CLINIC 67 | Facility: CLINIC | Age: 70
End: 2025-02-28
Payer: MEDICARE

## 2025-02-28 DIAGNOSIS — K50.019 CROHN'S DISEASE OF SMALL INTESTINE WITH UNSPECIFIED COMPLICA: ICD-10-CM

## 2025-02-28 PROCEDURE — 99426 PRIN CARE MGMT STAFF 1ST 30: CPT | Performed by: INTERNAL MEDICINE

## 2025-03-31 ENCOUNTER — OFFICE (OUTPATIENT)
Dept: URBAN - METROPOLITAN AREA CLINIC 67 | Facility: CLINIC | Age: 70
End: 2025-03-31
Payer: MEDICARE

## 2025-03-31 DIAGNOSIS — K50.818 CROHN'S DISEASE OF BOTH SMALL AND LARGE INTESTINE WITH OTHER: ICD-10-CM

## 2025-03-31 DIAGNOSIS — K80.50 CALCULUS OF BILE DUCT WITHOUT CHOLANGITIS OR CHOLECYSTITIS W: ICD-10-CM

## 2025-03-31 PROCEDURE — 99426 PRIN CARE MGMT STAFF 1ST 30: CPT | Performed by: INTERNAL MEDICINE

## 2025-04-17 ENCOUNTER — APPOINTMENT (OUTPATIENT)
Dept: URBAN - METROPOLITAN AREA SURGERY 12 | Age: 70
Setting detail: DERMATOLOGY
End: 2025-04-17

## 2025-04-17 DIAGNOSIS — D485 NEOPLASM OF UNCERTAIN BEHAVIOR OF SKIN: ICD-10-CM

## 2025-04-17 DIAGNOSIS — L82.1 OTHER SEBORRHEIC KERATOSIS: ICD-10-CM

## 2025-04-17 DIAGNOSIS — L57.8 OTHER SKIN CHANGES DUE TO CHRONIC EXPOSURE TO NONIONIZING RADIATION: ICD-10-CM

## 2025-04-17 DIAGNOSIS — L81.4 OTHER MELANIN HYPERPIGMENTATION: ICD-10-CM

## 2025-04-17 DIAGNOSIS — D22 MELANOCYTIC NEVI: ICD-10-CM

## 2025-04-17 DIAGNOSIS — D18.0 HEMANGIOMA: ICD-10-CM

## 2025-04-17 DIAGNOSIS — Z85.828 PERSONAL HISTORY OF OTHER MALIGNANT NEOPLASM OF SKIN: ICD-10-CM

## 2025-04-17 PROBLEM — D18.01 HEMANGIOMA OF SKIN AND SUBCUTANEOUS TISSUE: Status: ACTIVE | Noted: 2025-04-17

## 2025-04-17 PROBLEM — D22.5 MELANOCYTIC NEVI OF TRUNK: Status: ACTIVE | Noted: 2025-04-17

## 2025-04-17 PROBLEM — D48.5 NEOPLASM OF UNCERTAIN BEHAVIOR OF SKIN: Status: ACTIVE | Noted: 2025-04-17

## 2025-04-17 PROCEDURE — OTHER MIPS QUALITY: OTHER

## 2025-04-17 PROCEDURE — OTHER COUNSELING: OTHER

## 2025-04-17 PROCEDURE — OTHER SUNSCREEN RECOMMENDATIONS: OTHER

## 2025-04-17 PROCEDURE — 11103 TANGNTL BX SKIN EA SEP/ADDL: CPT

## 2025-04-17 PROCEDURE — OTHER REASSURANCE: OTHER

## 2025-04-17 PROCEDURE — 99213 OFFICE O/P EST LOW 20 MIN: CPT | Mod: 25

## 2025-04-17 PROCEDURE — 11102 TANGNTL BX SKIN SINGLE LES: CPT

## 2025-04-17 PROCEDURE — OTHER BIOPSY BY SHAVE METHOD: OTHER

## 2025-04-17 ASSESSMENT — LOCATION SIMPLE DESCRIPTION DERM
LOCATION SIMPLE: LEFT UPPER BACK
LOCATION SIMPLE: CHEST
LOCATION SIMPLE: RIGHT SHOULDER
LOCATION SIMPLE: RIGHT BACK
LOCATION SIMPLE: RIGHT UPPER BACK

## 2025-04-17 ASSESSMENT — LOCATION DETAILED DESCRIPTION DERM
LOCATION DETAILED: LEFT MID-UPPER BACK
LOCATION DETAILED: RIGHT SUPERIOR UPPER BACK
LOCATION DETAILED: RIGHT MEDIAL SUPERIOR CHEST
LOCATION DETAILED: RIGHT ANTERIOR SHOULDER
LOCATION DETAILED: LEFT SUPERIOR UPPER BACK
LOCATION DETAILED: RIGHT SUPERIOR LATERAL UPPER BACK

## 2025-04-17 ASSESSMENT — LOCATION ZONE DERM
LOCATION ZONE: ARM
LOCATION ZONE: TRUNK

## 2025-04-17 NOTE — HPI: FULL BODY SKIN EXAMINATION
How Severe Are Your Spot(S)?: mild
What Type Of Note Output Would You Prefer (Optional)?: Bullet Format
What Is The Reason For Today's Visit?: Full Body Skin Examination
What Is The Reason For Today's Visit? (Being Monitored For X): concerning skin lesions on a periodic basis
Additional History: Pt in gown for fbse.

## 2025-04-17 NOTE — PROCEDURE: BIOPSY BY SHAVE METHOD
Detail Level: Detailed
Depth Of Biopsy: dermis
Was A Bandage Applied: Yes
Size Of Lesion In Cm: 0
Biopsy Type: H and E
Biopsy Method: Dermablade
Anesthesia Type: 1% lidocaine with epinephrine
Anesthesia Volume In Cc: 0.5
Hemostasis: Drysol
Wound Care: Petrolatum
Dressing: bandage
Destruction After The Procedure: No
Type Of Destruction Used: Curettage
Curettage Text: The wound bed was treated with curettage after the biopsy was performed.
Cryotherapy Text: The wound bed was treated with cryotherapy after the biopsy was performed.
Electrodesiccation Text: The wound bed was treated with electrodesiccation after the biopsy was performed.
Electrodesiccation And Curettage Text: The wound bed was treated with electrodesiccation and curettage after the biopsy was performed.
Silver Nitrate Text: The wound bed was treated with silver nitrate after the biopsy was performed.
Lab: -[id number]
Lab Facility: [id number]
Medical Necessity Information: It is in your best interest to select a reason for this procedure from the list below. All of these items fulfill various CMS LCD requirements except the new and changing color options.
Consent: Written consent was obtained and risks were reviewed including but not limited to scarring, infection, bleeding, scabbing, incomplete removal, nerve damage and allergy to anesthesia.
Post-Care Instructions: I reviewed with the patient in detail post-care instructions. Patient is to keep the biopsy site dry overnight, and then apply bacitracin twice daily until healed. Patient may apply hydrogen peroxide soaks to remove any crusting.
Notification Instructions: Patient will be notified of biopsy results. However, patient instructed to call the office if not contacted within 2 weeks.
Billing Type: Third-Party Bill
Information: Selecting Yes will display possible errors in your note based on the variables you have selected. This validation is only offered as a suggestion for you. PLEASE NOTE THAT THE VALIDATION TEXT WILL BE REMOVED WHEN YOU FINALIZE YOUR NOTE. IF YOU WANT TO FAX A PRELIMINARY NOTE YOU WILL NEED TO TOGGLE THIS TO 'NO' IF YOU DO NOT WANT IT IN YOUR FAXED NOTE.

## 2025-06-30 ENCOUNTER — OFFICE (OUTPATIENT)
Dept: URBAN - METROPOLITAN AREA CLINIC 67 | Facility: CLINIC | Age: 70
End: 2025-06-30
Payer: MEDICARE

## 2025-06-30 DIAGNOSIS — K50.818 CROHN'S DISEASE OF BOTH SMALL AND LARGE INTESTINE WITH OTHER: ICD-10-CM

## 2025-06-30 DIAGNOSIS — K80.50 CALCULUS OF BILE DUCT WITHOUT CHOLANGITIS OR CHOLECYSTITIS W: ICD-10-CM

## 2025-06-30 PROCEDURE — 99426 PRIN CARE MGMT STAFF 1ST 30: CPT | Performed by: INTERNAL MEDICINE

## 2025-08-18 ENCOUNTER — OFFICE (OUTPATIENT)
Dept: URBAN - METROPOLITAN AREA CLINIC 72 | Facility: CLINIC | Age: 70
End: 2025-08-18
Payer: MEDICARE

## 2025-08-18 VITALS
SYSTOLIC BLOOD PRESSURE: 120 MMHG | OXYGEN SATURATION: 97 % | DIASTOLIC BLOOD PRESSURE: 70 MMHG | WEIGHT: 112 LBS | HEIGHT: 59 IN | HEART RATE: 73 BPM

## 2025-08-18 DIAGNOSIS — K58.0 IRRITABLE BOWEL SYNDROME WITH DIARRHEA: ICD-10-CM

## 2025-08-18 DIAGNOSIS — Z09 ENCOUNTER FOR FOLLOW-UP EXAMINATION AFTER COMPLETED TREATMEN: ICD-10-CM

## 2025-08-18 DIAGNOSIS — Z90.49 ACQUIRED ABSENCE OF OTHER SPECIFIED PARTS OF DIGESTIVE TRACT: ICD-10-CM

## 2025-08-18 DIAGNOSIS — R10.13 EPIGASTRIC PAIN: ICD-10-CM

## 2025-08-18 DIAGNOSIS — R13.19 OTHER DYSPHAGIA: ICD-10-CM

## 2025-08-18 DIAGNOSIS — K21.9 GASTRO-ESOPHAGEAL REFLUX DISEASE WITHOUT ESOPHAGITIS: ICD-10-CM

## 2025-08-18 DIAGNOSIS — E55.9 VITAMIN D DEFICIENCY, UNSPECIFIED: ICD-10-CM

## 2025-08-18 DIAGNOSIS — Z86.19 PERSONAL HISTORY OF OTHER INFECTIOUS AND PARASITIC DISEASES: ICD-10-CM

## 2025-08-18 DIAGNOSIS — K50.818 CROHN'S DISEASE OF BOTH SMALL AND LARGE INTESTINE WITH OTHER: ICD-10-CM

## 2025-08-18 PROCEDURE — 99214 OFFICE O/P EST MOD 30 MIN: CPT | Performed by: INTERNAL MEDICINE

## 2025-08-18 RX ORDER — RIFAXIMIN 550 MG/1
1650 TABLET ORAL
Qty: 42 | Refills: 0 | Status: ACTIVE
Start: 2025-08-18